# Patient Record
Sex: FEMALE | Race: BLACK OR AFRICAN AMERICAN | NOT HISPANIC OR LATINO | Employment: FULL TIME | URBAN - METROPOLITAN AREA
[De-identification: names, ages, dates, MRNs, and addresses within clinical notes are randomized per-mention and may not be internally consistent; named-entity substitution may affect disease eponyms.]

---

## 2023-04-16 LAB — GROUP B STREP SCREEN: NORMAL

## 2023-12-20 ENCOUNTER — INITIAL PRENATAL (OUTPATIENT)
Dept: OBSTETRICS AND GYNECOLOGY | Facility: CLINIC | Age: 38
End: 2023-12-20
Payer: COMMERCIAL

## 2023-12-20 ENCOUNTER — LAB (OUTPATIENT)
Dept: LAB | Facility: LAB | Age: 38
End: 2023-12-20
Payer: COMMERCIAL

## 2023-12-20 VITALS
BODY MASS INDEX: 33.36 KG/M2 | SYSTOLIC BLOOD PRESSURE: 102 MMHG | WEIGHT: 194.38 LBS | DIASTOLIC BLOOD PRESSURE: 74 MMHG

## 2023-12-20 DIAGNOSIS — Z34.91 PRENATAL CARE IN FIRST TRIMESTER (HHS-HCC): Primary | ICD-10-CM

## 2023-12-20 DIAGNOSIS — Z34.91 PRENATAL CARE IN FIRST TRIMESTER (HHS-HCC): ICD-10-CM

## 2023-12-20 PROBLEM — Z34.80 SUPERVISION OF OTHER NORMAL PREGNANCY, ANTEPARTUM (HHS-HCC): Status: ACTIVE | Noted: 2023-12-20

## 2023-12-20 PROBLEM — O09.529 ADVANCED MATERNAL AGE IN MULTIGRAVIDA (HHS-HCC): Status: ACTIVE | Noted: 2023-12-20

## 2023-12-20 LAB
ABO GROUP (TYPE) IN BLOOD: NORMAL
ANTIBODY SCREEN: NORMAL
ERYTHROCYTE [DISTWIDTH] IN BLOOD BY AUTOMATED COUNT: 12.8 % (ref 11.5–14.5)
EST. AVERAGE GLUCOSE BLD GHB EST-MCNC: 105 MG/DL
HBA1C MFR BLD: 5.3 %
HBV SURFACE AG SERPL QL IA: NONREACTIVE
HCT VFR BLD AUTO: 41.4 % (ref 36–46)
HCV AB SER QL: NONREACTIVE
HGB BLD-MCNC: 13.1 G/DL (ref 12–16)
HIV 1+2 AB+HIV1 P24 AG SERPL QL IA: NONREACTIVE
MCH RBC QN AUTO: 27.8 PG (ref 26–34)
MCHC RBC AUTO-ENTMCNC: 31.6 G/DL (ref 32–36)
MCV RBC AUTO: 88 FL (ref 80–100)
NRBC BLD-RTO: 0 /100 WBCS (ref 0–0)
PLATELET # BLD AUTO: 312 X10*3/UL (ref 150–450)
PREGNANCY TEST URINE, POC: POSITIVE
RBC # BLD AUTO: 4.72 X10*6/UL (ref 4–5.2)
REFLEX ADDED, ANEMIA PANEL: NORMAL
RH FACTOR (ANTIGEN D): NORMAL
RUBV IGG SERPL IA-ACNC: 1.9 IA
RUBV IGG SERPL QL IA: POSITIVE
T PALLIDUM AB SER QL: NONREACTIVE
WBC # BLD AUTO: 5.3 X10*3/UL (ref 4.4–11.3)

## 2023-12-20 PROCEDURE — 86803 HEPATITIS C AB TEST: CPT

## 2023-12-20 PROCEDURE — 87800 DETECT AGNT MULT DNA DIREC: CPT

## 2023-12-20 PROCEDURE — 87340 HEPATITIS B SURFACE AG IA: CPT

## 2023-12-20 PROCEDURE — 86901 BLOOD TYPING SEROLOGIC RH(D): CPT

## 2023-12-20 PROCEDURE — 86900 BLOOD TYPING SEROLOGIC ABO: CPT

## 2023-12-20 PROCEDURE — 81025 URINE PREGNANCY TEST: CPT

## 2023-12-20 PROCEDURE — 87086 URINE CULTURE/COLONY COUNT: CPT

## 2023-12-20 PROCEDURE — 83036 HEMOGLOBIN GLYCOSYLATED A1C: CPT

## 2023-12-20 PROCEDURE — 36415 COLL VENOUS BLD VENIPUNCTURE: CPT

## 2023-12-20 PROCEDURE — 87389 HIV-1 AG W/HIV-1&-2 AB AG IA: CPT

## 2023-12-20 PROCEDURE — 86850 RBC ANTIBODY SCREEN: CPT

## 2023-12-20 PROCEDURE — 0500F INITIAL PRENATAL CARE VISIT: CPT

## 2023-12-20 PROCEDURE — 86317 IMMUNOASSAY INFECTIOUS AGENT: CPT

## 2023-12-20 PROCEDURE — 86780 TREPONEMA PALLIDUM: CPT

## 2023-12-20 PROCEDURE — 85027 COMPLETE CBC AUTOMATED: CPT

## 2023-12-20 RX ORDER — BIOTIN 10 MG
TABLET ORAL
COMMUNITY

## 2023-12-20 RX ORDER — GLUCOSAM/CHONDRO/HERB 149/HYAL 750-100 MG
TABLET ORAL
COMMUNITY

## 2023-12-20 RX ORDER — ASCORBIC ACID 250 MG
250 TABLET ORAL DAILY
COMMUNITY

## 2023-12-20 ASSESSMENT — ENCOUNTER SYMPTOMS
GASTROINTESTINAL NEGATIVE: 0
ENDOCRINE NEGATIVE: 0
NEUROLOGICAL NEGATIVE: 0
RESPIRATORY NEGATIVE: 0
CARDIOVASCULAR NEGATIVE: 0
EYES NEGATIVE: 0
PSYCHIATRIC NEGATIVE: 0
HEMATOLOGIC/LYMPHATIC NEGATIVE: 0
ALLERGIC/IMMUNOLOGIC NEGATIVE: 0
MUSCULOSKELETAL NEGATIVE: 0
CONSTITUTIONAL NEGATIVE: 0

## 2023-12-20 NOTE — PROGRESS NOTES
Paige Vieyra is a 38 y.o.  at Unknown with a working estimated date of delivery of Not found. who presents for an initial prenatal visit. This pregnancy is unplanned.    No LMP recorded. Patient is pregnant.  Still breastfeeding, though supply has decreased per patient since having her positive pregnancy test.  LMP sometime in October; this was her only menses since having her son in May of this year.  Dating US ordered and patient advised to schedule.     Patient currently experiencing:  Nausea    Bleeding or cramping since LMP: no  Taking prenatal vitamin: Yes  Ultrasound completed this pregnancy: No      OB History    Para Term  AB Living   5 3 3 0 1 3   SAB IAB Ectopic Multiple Live Births   1 0 0 0 3      # Outcome Date GA Lbr Manuel/2nd Weight Sex Delivery Anes PTL Lv   5 Current            4 SAB            3 Term      Vag-Spont      2 Term      Vag-Spont      1 Term      Vag-Spont           Prior pregnancy complications: AMA, had IAI with son born in May.    History of hypertension:  No    Preeclampsia Risk - ASA prophylaxis; pt does meet criteria (age, BMI, race). Will start 162 mg. ASA daily at 12-16 weeks.    History reviewed. No pertinent past medical history.     Last pap: 2022, normal HPV neg.  Hx Depression/Anxiety - No    Past Surgical History:   Procedure Laterality Date    OTHER SURGICAL HISTORY  2020    Colposcopy    OTHER SURGICAL HISTORY  2020    Cystoscopy    OTHER SURGICAL HISTORY  2020    Laminectomy      Discussed patient history of laminectomy with Dr. Mary during her prior pregnancy -- No need for anesthesia consult per Dr. Mary.     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 Oriented to practice, including available collaboration and consulting with physicians.   Discussed routine OB labs, including serum STI/HIV, CBC, blood type and screen.    Pregravid BMI: 33.46  Expected Total Weight Gain: 5 kg (11  lb)-9 kg (19 lb)     Obesity in Pregnancy -- BMI >30; HgA1C to be ordered with new OB labs. Limiting weight gain to 11-20 lbs through healthy eating habits and exercise reviewed.     Education provided r/t nutrition, folic acid supplementation, dietary guidelines, exercise, smoking, alcohol, caffeine, and drug use.    Covid vaccinated x 2; declines further booster.  Strong recommendation and support for Covid vaccine discussed. Patient aware of increased rate of hospitalization, intubation, and death with Covid in pregnancy - Demonstrated safety of vaccination during pregnancy with no associated increases in GDM, hypertensive disorders, miscarriage/ labor nor fetal anomalies reviewed. Patient aware vaccination is recommended by ACNM, ACOG, SMFM, and CDC - She declines booster vaccination.    Flu vaccinated.       Current Outpatient Medications:     ascorbic acid (Vitamin C) 250 mg tablet, Take 1 tablet (250 mg) by mouth once daily., Disp: , Rfl:     omega 3-dha-epa-fish oil (Fish OiL) 1,000 mg (120 mg-180 mg) capsule, Take by mouth., Disp: , Rfl:     prenatal19-iron-folic-omega3 29-1-400 mg combo pack,tablet and cap,, Take by mouth., Disp: , Rfl:      Genetic Testing - The patient was counselled regarding prenatal genetic testing options and was provided literature in the obstetric folder. First line screening options, including cfDNA and first trimester ultrasound for nuchal translucency, were discussed and offered.  Benefits, drawbacks, and limitations were explained respectively.  It was clearly stated that only diagnostic testing (amniocentesis) provides definitive information regarding a genetic diagnosis in the fetus. It was discussed with the patient that the false positive rates for NIPT is higher for women under 35 years of age. The patient was informed that the cost of NIPT ranges and may not be covered by insurance depending on patient's age and risk factors. Patient aware that gender testing is  available at a fraction of the cost through [employer].  This patient has decided to decline.  Also does not believe that she wants to have 13 week NT US.     Warning s/s discussed and SAB precautions reviewed.  F/U in 4 weeks -- Review dating U/S and new OB labs.    Vijaya Richter, AMNA-MARIOM

## 2023-12-21 LAB
BACTERIA UR CULT: NORMAL
C TRACH RRNA SPEC QL NAA+PROBE: NEGATIVE
N GONORRHOEA DNA SPEC QL PROBE+SIG AMP: NEGATIVE

## 2023-12-22 ENCOUNTER — ANCILLARY PROCEDURE (OUTPATIENT)
Dept: RADIOLOGY | Facility: CLINIC | Age: 38
End: 2023-12-22
Payer: COMMERCIAL

## 2023-12-22 DIAGNOSIS — Z34.91 PRENATAL CARE IN FIRST TRIMESTER (HHS-HCC): ICD-10-CM

## 2023-12-22 PROCEDURE — 76817 TRANSVAGINAL US OBSTETRIC: CPT | Performed by: OBSTETRICS & GYNECOLOGY

## 2023-12-22 PROCEDURE — 76817 TRANSVAGINAL US OBSTETRIC: CPT

## 2023-12-22 PROCEDURE — 76801 OB US < 14 WKS SINGLE FETUS: CPT

## 2023-12-22 PROCEDURE — 76801 OB US < 14 WKS SINGLE FETUS: CPT | Performed by: OBSTETRICS & GYNECOLOGY

## 2024-01-18 ENCOUNTER — ROUTINE PRENATAL (OUTPATIENT)
Dept: OBSTETRICS AND GYNECOLOGY | Facility: CLINIC | Age: 39
End: 2024-01-18
Payer: COMMERCIAL

## 2024-01-18 ENCOUNTER — TELEPHONE (OUTPATIENT)
Dept: OBSTETRICS AND GYNECOLOGY | Facility: CLINIC | Age: 39
End: 2024-01-18

## 2024-01-18 VITALS — SYSTOLIC BLOOD PRESSURE: 102 MMHG | WEIGHT: 197.5 LBS | DIASTOLIC BLOOD PRESSURE: 66 MMHG | BODY MASS INDEX: 33.9 KG/M2

## 2024-01-18 DIAGNOSIS — O09.521 MULTIGRAVIDA OF ADVANCED MATERNAL AGE IN FIRST TRIMESTER (HHS-HCC): ICD-10-CM

## 2024-01-18 DIAGNOSIS — Z34.80 SUPERVISION OF OTHER NORMAL PREGNANCY, ANTEPARTUM (HHS-HCC): Primary | ICD-10-CM

## 2024-01-18 PROCEDURE — 0501F PRENATAL FLOW SHEET: CPT

## 2024-01-18 NOTE — PROGRESS NOTES
Subjective   Paige Vieyra is a 38 y.o.  at 9w6d with a working estimated date of delivery of 2024, by Ultrasound who presents for a routine prenatal visit.    Patient reports overall feeling well; no concerns or OB complaints.  Patient had dating US done; states that she was advised to return in 2 weeks, though she does not plan to do so, as the ultrasounds are expensive.  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     Objective   Visit Vitals  /66   Wt 89.6 kg (197 lb 8 oz)   LMP  (LMP Unknown)   BMI 33.90 kg/m²   OB Status Pregnant   Smoking Status Never   BSA 2.01 m²     Vitals:    24 1113   Weight: 89.6 kg (197 lb 8 oz)      Pregravid Weight/BMI - 88.5 kg (195 lb) / 33.46  Expected Total Weight Gain - 5 kg (11 lb)-9 kg (19 lb)  TW.134 kg (2 lb 8 oz)    Assessment/Plan   Reviewed normal new OB labs.  Reviewed dating US -- Advised that she may reschedule for follow-up ultrasound as recommended by [facility], though patient declines.  Offered follow-up prenatal visit in 1 week for fetal heart tones (unable to obtain by Doppler today given early GA), though patient declines.  States that she will wait until her next PNV.  Reminded to begin ASA at 12 weeks.   Warning signs and symptoms reviewed; patient has emergency answering service phone line number and is aware of when to call.   Remainder of plan per problem list as below.     Medical Problems       Problem List       Supervision of other normal pregnancy, antepartum    Overview Addendum 2023 10:44 AM by ANNA Ayala     Starting BMI 32.  Covid vaccinated x 2, declines booster.  Flu vaccinated.  Hx of HSV-1, only oral outbreaks per patient.  Will begin ASA at 12 weeks.         Advanced maternal age in multigravida    Overview Signed 2023 10:44 AM by ANNA Ayala     Declines aneuploidy screening.             F/U in 4 weeks.    ANNA Ayala

## 2024-01-18 NOTE — TELEPHONE ENCOUNTER
Pt is requesting a dentist note to provide to her dentist for a future appointment. Name of office is [facility].

## 2024-02-15 ENCOUNTER — ROUTINE PRENATAL (OUTPATIENT)
Dept: OBSTETRICS AND GYNECOLOGY | Facility: CLINIC | Age: 39
End: 2024-02-15
Payer: COMMERCIAL

## 2024-02-15 VITALS — SYSTOLIC BLOOD PRESSURE: 116 MMHG | DIASTOLIC BLOOD PRESSURE: 78 MMHG | WEIGHT: 197.6 LBS | BODY MASS INDEX: 33.92 KG/M2

## 2024-02-15 DIAGNOSIS — O21.9 NAUSEA AND VOMITING IN PREGNANCY PRIOR TO 22 WEEKS GESTATION (HHS-HCC): ICD-10-CM

## 2024-02-15 DIAGNOSIS — Z34.80 SUPERVISION OF OTHER NORMAL PREGNANCY, ANTEPARTUM (HHS-HCC): Primary | ICD-10-CM

## 2024-02-15 PROCEDURE — 0501F PRENATAL FLOW SHEET: CPT

## 2024-02-15 RX ORDER — ASPIRIN 81 MG/1
162 TABLET ORAL DAILY
Qty: 60 TABLET | Refills: 11 | Status: SHIPPED | OUTPATIENT
Start: 2024-02-15 | End: 2025-02-14

## 2024-02-15 RX ORDER — ONDANSETRON 4 MG/1
4 TABLET, FILM COATED ORAL EVERY 8 HOURS PRN
Qty: 30 TABLET | Refills: 0 | Status: SHIPPED | OUTPATIENT
Start: 2024-02-15

## 2024-02-15 NOTE — PROGRESS NOTES
Subjective   Paige Vieyra is a 38 y.o.  at 13w6d with a working estimated date of delivery of 2024, by Ultrasound who presents for a routine prenatal visit.    Patient reports overall feeling well; no concerns or OB complaints.  Occasional nausea; requesting Zofran Rx to have as needed.  Requesting Rx for ASA.    Objective   Visit Vitals  /78   Wt 89.6 kg (197 lb 9.6 oz)   LMP  (LMP Unknown)   BMI 33.92 kg/m²   OB Status Pregnant   Smoking Status Never   BSA 2.01 m²     Vitals:    02/15/24 1016   Weight: 89.6 kg (197 lb 9.6 oz)      Pregravid Weight/BMI - 88.5 kg (195 lb) / 33.46  Expected Total Weight Gain - 5 kg (11 lb)-9 kg (19 lb)  TW.179 kg (2 lb 9.6 oz)  Fundal height and FHR per prenatal flowsheet.    Assessment/Plan   Advised to schedule for anatomy US at 19 weeks.   Rx for Zofran and ASA sent to patient pharmacy.   Dental letter provided.   Warning signs and symptoms reviewed; patient has emergency answering service phone line number and is aware of when to call.   Remainder of plan per problem list as below.     Medical Problems       Problem List       Supervision of other normal pregnancy, antepartum    Overview Addendum 2023 10:44 AM by ANNA Ayala     Starting BMI 32.  Covid vaccinated x 2, declines booster.  Flu vaccinated.  Hx of HSV-1, only oral outbreaks per patient.  Will begin ASA at 12 weeks.         Advanced maternal age in multigravida    Overview Signed 2023 10:44 AM by ANNA Ayala     Declines aneuploidy screening.             F/U in 4 weeks.    ANNA Ayala

## 2024-03-14 ENCOUNTER — ROUTINE PRENATAL (OUTPATIENT)
Dept: OBSTETRICS AND GYNECOLOGY | Facility: CLINIC | Age: 39
End: 2024-03-14
Payer: COMMERCIAL

## 2024-03-14 VITALS — BODY MASS INDEX: 35.79 KG/M2 | DIASTOLIC BLOOD PRESSURE: 56 MMHG | WEIGHT: 208.5 LBS | SYSTOLIC BLOOD PRESSURE: 114 MMHG

## 2024-03-14 DIAGNOSIS — Z34.80 SUPERVISION OF OTHER NORMAL PREGNANCY, ANTEPARTUM (HHS-HCC): Primary | ICD-10-CM

## 2024-03-14 DIAGNOSIS — O26.899 PELVIC PRESSURE IN PREGNANCY (HHS-HCC): ICD-10-CM

## 2024-03-14 DIAGNOSIS — O09.522 MULTIGRAVIDA OF ADVANCED MATERNAL AGE IN SECOND TRIMESTER (HHS-HCC): ICD-10-CM

## 2024-03-14 DIAGNOSIS — R10.2 PELVIC PRESSURE IN PREGNANCY (HHS-HCC): ICD-10-CM

## 2024-03-14 PROCEDURE — 0501F PRENATAL FLOW SHEET: CPT

## 2024-03-14 NOTE — PROGRESS NOTES
Subjective   Paige Vieyra is a 38 y.o.  at 17w6d with a working estimated date of delivery of 2024, by Ultrasound who presents for a routine prenatal visit.    Patient reports overall feeling well; no concerns or OB complaints.  Felt flutters for the first time last night!  Feeling occasional pelvic pressure.    Objective   Visit Vitals  /56   Wt 94.6 kg (208 lb 8 oz)   LMP  (LMP Unknown)   BMI 35.79 kg/m²   OB Status Pregnant   Smoking Status Never   BSA 2.07 m²     Vitals:    24 1126   Weight: 94.6 kg (208 lb 8 oz)      Pregravid Weight/BMI - 88.5 kg (195 lb) / 33.46  Expected Total Weight Gain - 5 kg (11 lb)-9 kg (19 lb)  TW.124 kg (13 lb 8 oz)  Fundal height and FHR per prenatal flowsheet.    Assessment/Plan   Anatomy US is scheduled.   Comfort measures for pelvic pressure reviewed, plans to purchase belly band.   Warning signs and symptoms reviewed; patient has emergency answering service phone line number and is aware of when to call.   Remainder of plan per problem list as below.     Medical Problems       Problem List       Supervision of other normal pregnancy, antepartum    Overview Addendum 2023 10:44 AM by ANNA Ayala     Starting BMI 32.  Covid vaccinated x 2, declines booster.  Flu vaccinated.  Hx of HSV-1, only oral outbreaks per patient.  Will begin ASA at 12 weeks.         Advanced maternal age in multigravida    Overview Signed 2023 10:44 AM by ANNA Ayala     Declines aneuploidy screening.             F/U in 4 weeks; order GTT/CBC.    ANNA Ayala

## 2024-04-04 ENCOUNTER — HOSPITAL ENCOUNTER (OUTPATIENT)
Dept: RADIOLOGY | Facility: CLINIC | Age: 39
Discharge: HOME | End: 2024-04-04
Payer: COMMERCIAL

## 2024-04-04 DIAGNOSIS — Z34.91 PRENATAL CARE IN FIRST TRIMESTER (HHS-HCC): ICD-10-CM

## 2024-04-04 PROCEDURE — 76811 OB US DETAILED SNGL FETUS: CPT

## 2024-04-04 PROCEDURE — 76811 OB US DETAILED SNGL FETUS: CPT | Performed by: OBSTETRICS & GYNECOLOGY

## 2024-04-11 ENCOUNTER — ROUTINE PRENATAL (OUTPATIENT)
Dept: OBSTETRICS AND GYNECOLOGY | Facility: CLINIC | Age: 39
End: 2024-04-11
Payer: COMMERCIAL

## 2024-04-11 ENCOUNTER — APPOINTMENT (OUTPATIENT)
Dept: OBSTETRICS AND GYNECOLOGY | Facility: CLINIC | Age: 39
End: 2024-04-11
Payer: COMMERCIAL

## 2024-04-11 VITALS — DIASTOLIC BLOOD PRESSURE: 70 MMHG | BODY MASS INDEX: 36.48 KG/M2 | SYSTOLIC BLOOD PRESSURE: 110 MMHG | WEIGHT: 212.5 LBS

## 2024-04-11 DIAGNOSIS — Z34.80 SUPERVISION OF OTHER NORMAL PREGNANCY, ANTEPARTUM (HHS-HCC): Primary | ICD-10-CM

## 2024-04-11 DIAGNOSIS — O09.522 MULTIGRAVIDA OF ADVANCED MATERNAL AGE IN SECOND TRIMESTER (HHS-HCC): ICD-10-CM

## 2024-04-11 DIAGNOSIS — Z13.1 SCREENING FOR DIABETES MELLITUS (DM): ICD-10-CM

## 2024-04-11 PROCEDURE — 0501F PRENATAL FLOW SHEET: CPT

## 2024-04-11 ASSESSMENT — EDINBURGH POSTNATAL DEPRESSION SCALE (EPDS)
I HAVE BEEN ABLE TO LAUGH AND SEE THE FUNNY SIDE OF THINGS: AS MUCH AS I ALWAYS COULD
TOTAL SCORE: 0
THE THOUGHT OF HARMING MYSELF HAS OCCURRED TO ME: NEVER
I HAVE BEEN ANXIOUS OR WORRIED FOR NO GOOD REASON: NO, NOT AT ALL
I HAVE BLAMED MYSELF UNNECESSARILY WHEN THINGS WENT WRONG: NO, NEVER
I HAVE BEEN SO UNHAPPY THAT I HAVE HAD DIFFICULTY SLEEPING: NOT AT ALL
I HAVE LOOKED FORWARD WITH ENJOYMENT TO THINGS: AS MUCH AS I EVER DID
THINGS HAVE BEEN GETTING ON TOP OF ME: NO, I HAVE BEEN COPING AS WELL AS EVER
I HAVE FELT SCARED OR PANICKY FOR NO GOOD REASON: NO, NOT AT ALL
I HAVE FELT SAD OR MISERABLE: NO, NOT AT ALL
I HAVE BEEN SO UNHAPPY THAT I HAVE BEEN CRYING: NO, NEVER

## 2024-04-11 NOTE — PROGRESS NOTES
Subjective   Paige Vieyra is a 38 y.o.  at 21w6d with a working estimated date of delivery of 2024, by Ultrasound who presents for a routine prenatal visit.    Patient reports overall feeling well; no concerns or OB complaints.  Good fetal movement.  It's a GIRL!     Objective   Visit Vitals  /70   Wt 96.4 kg (212 lb 8 oz)   LMP  (LMP Unknown)   BMI 36.48 kg/m²   OB Status Pregnant   Smoking Status Never   BSA 2.09 m²     Vitals:    24 1134   Weight: 96.4 kg (212 lb 8 oz)      Pregravid Weight/BMI - 88.5 kg (195 lb) / 33.46  Expected Total Weight Gain - 5 kg (11 lb)-9 kg (19 lb)  TW.938 kg (17 lb 8 oz)  Fundal height and FHR per prenatal flowsheet.    Assessment/Plan   Reviewed normal anatomy US.  28 week folder provided.  Warning signs and symptoms reviewed; patient has emergency answering service phone line number and is aware of when to call.   Remainder of plan per problem list as below.     Medical Problems       Problem List       Supervision of other normal pregnancy, antepartum    Overview Addendum 2023 10:44 AM by ANNA Ayala     Starting BMI 32.  Covid vaccinated x 2, declines booster.  Flu vaccinated.  Hx of HSV-1, only oral outbreaks per patient.  Will begin ASA at 12 weeks.         Advanced maternal age in multigravida    Overview Signed 2023 10:44 AM by ANNA Ayala     Declines aneuploidy screening.             F/U in 4 weeks; GTT/CBC, discuss Tdap.    ANNA Ayala

## 2024-05-09 ENCOUNTER — APPOINTMENT (OUTPATIENT)
Dept: OBSTETRICS AND GYNECOLOGY | Facility: CLINIC | Age: 39
End: 2024-05-09
Payer: COMMERCIAL

## 2024-05-14 ENCOUNTER — APPOINTMENT (OUTPATIENT)
Dept: OPHTHALMOLOGY | Facility: CLINIC | Age: 39
End: 2024-05-14
Payer: COMMERCIAL

## 2024-05-30 ENCOUNTER — LAB (OUTPATIENT)
Dept: LAB | Facility: LAB | Age: 39
End: 2024-05-30
Payer: COMMERCIAL

## 2024-05-30 DIAGNOSIS — Z13.1 SCREENING FOR DIABETES MELLITUS (DM): ICD-10-CM

## 2024-05-30 LAB
ERYTHROCYTE [DISTWIDTH] IN BLOOD BY AUTOMATED COUNT: 13.1 % (ref 11.5–14.5)
GLUCOSE 1H P 50 G GLC PO SERPL-MCNC: 86 MG/DL
HCT VFR BLD AUTO: 37.5 % (ref 36–46)
HGB BLD-MCNC: 12.3 G/DL (ref 12–16)
MCH RBC QN AUTO: 28.2 PG (ref 26–34)
MCHC RBC AUTO-ENTMCNC: 32.8 G/DL (ref 32–36)
MCV RBC AUTO: 86 FL (ref 80–100)
NRBC BLD-RTO: 0 /100 WBCS (ref 0–0)
PLATELET # BLD AUTO: 309 X10*3/UL (ref 150–450)
RBC # BLD AUTO: 4.36 X10*6/UL (ref 4–5.2)
REFLEX ADDED, ANEMIA PANEL: NORMAL
WBC # BLD AUTO: 7.8 X10*3/UL (ref 4.4–11.3)

## 2024-05-30 PROCEDURE — 85027 COMPLETE CBC AUTOMATED: CPT

## 2024-05-30 PROCEDURE — 36415 COLL VENOUS BLD VENIPUNCTURE: CPT

## 2024-05-30 PROCEDURE — 82947 ASSAY GLUCOSE BLOOD QUANT: CPT

## 2024-06-12 ENCOUNTER — APPOINTMENT (OUTPATIENT)
Dept: OBSTETRICS AND GYNECOLOGY | Facility: CLINIC | Age: 39
End: 2024-06-12
Payer: COMMERCIAL

## 2024-06-12 VITALS
SYSTOLIC BLOOD PRESSURE: 100 MMHG | BODY MASS INDEX: 37.61 KG/M2 | DIASTOLIC BLOOD PRESSURE: 70 MMHG | WEIGHT: 219.13 LBS

## 2024-06-12 DIAGNOSIS — Z3A.30 30 WEEKS GESTATION OF PREGNANCY (HHS-HCC): Primary | ICD-10-CM

## 2024-06-12 DIAGNOSIS — L30.9 ECZEMA, UNSPECIFIED TYPE: ICD-10-CM

## 2024-06-12 DIAGNOSIS — O99.891 BACK PAIN AFFECTING PREGNANCY IN THIRD TRIMESTER (HHS-HCC): ICD-10-CM

## 2024-06-12 DIAGNOSIS — M54.9 BACK PAIN AFFECTING PREGNANCY IN THIRD TRIMESTER (HHS-HCC): ICD-10-CM

## 2024-06-12 PROBLEM — Z98.890 HISTORY OF LAMINECTOMY: Status: ACTIVE | Noted: 2024-06-12

## 2024-06-12 PROCEDURE — 0501F PRENATAL FLOW SHEET: CPT

## 2024-06-12 RX ORDER — TRIAMCINOLONE ACETONIDE 1 MG/G
OINTMENT TOPICAL 2 TIMES DAILY
Qty: 15 G | Refills: 0 | Status: SHIPPED | OUTPATIENT
Start: 2024-06-12

## 2024-06-12 RX ORDER — CYCLOBENZAPRINE HCL 5 MG
5 TABLET ORAL DAILY PRN
Qty: 10 TABLET | Refills: 0 | Status: SHIPPED | OUTPATIENT
Start: 2024-06-12 | End: 2024-06-22

## 2024-06-12 NOTE — PROGRESS NOTES
Subjective   Paige Vieyra is a 38 y.o.  at 30w5d with a working estimated date of delivery of 2024, by Ultrasound who presents for a routine prenatal visit.    Patient tearful upon entrance to room today --    Last seen at 21 weeks; states that she was ill at the time that her last appointment was scheduled for.    States that she is in a lot of pain; lower back, hips, pelvis.  Has tried ibuprofen and Tylenol without relief.  Uses a massage chair that her daughter bought for her with some relief.      Concern for eczematous skin patches -- One on face, two on lower back, one on right breast.  Has tried Aquaphor and steroid cream with little relief.    Wants to potentially plan for 39 week IOL.    Objective   Visit Vitals  /70   Wt 99.4 kg (219 lb 2 oz)   LMP  (LMP Unknown)   BMI 37.61 kg/m²   OB Status Pregnant   Smoking Status Never   BSA 2.12 m²     Vitals:    24 1109   Weight: 99.4 kg (219 lb 2 oz)      Pregravid Weight/BMI - 88.5 kg (195 lb) / 33.46  Expected Total Weight Gain - 5 kg (11 lb)-9 kg (19 lb)  TWG: 10.9 kg (24 lb 2 oz)  Fundal height and FHR per prenatal flowsheet.    Assessment/Plan   Reviewed anatomy US with patient.  Recommended for 30 week follow up by MFM, though patient did not schedule, and states that she does not plan to do so, though is aware of the recommendation.  Reviewed normal GTT and Hgb 12.3.  Pain and discomfort during pregnancy -- Will send short-term Rx for Flexeril to patient pharmacy.  Reviewed safety profile during pregnancy.  Discussed NOT using ibuprofen and risks of ibuprofen use during pregnancy; Tylenol ONLY.  Will also place referral to chiropractic care in patient chart and outside referral also provided.  Eczematous skin patches -- Will send Rx for short term use of triamcinolone cream.  Reviewed safety profile during pregnancy.  Referral to dermatology also provided.   Warning signs and symptoms reviewed; patient has emergency answering  service phone line number and is aware of when to call.   Remainder of plan per problem list as below.     Medical Problems       Problem List       Supervision of other normal pregnancy, antepartum ([facility])    Overview Addendum 4/11/2024 12:56 PM by ANNA Ayala     Starting BMI 32.  Covid vaccinated x 2, declines booster.  Flu vaccinated.  Hx of HSV-1, only oral outbreaks per patient.  Will begin ASA at 12 weeks.  It's a GIRL!          Advanced maternal age in multigravida ([facility])    Overview Signed 12/20/2023 10:44 AM by ANNA Ayala     Declines aneuploidy screening.             F/U in 2 weeks.    ANNA Ayala

## 2024-06-27 ENCOUNTER — APPOINTMENT (OUTPATIENT)
Dept: OBSTETRICS AND GYNECOLOGY | Facility: CLINIC | Age: 39
End: 2024-06-27
Payer: COMMERCIAL

## 2024-07-03 ENCOUNTER — APPOINTMENT (OUTPATIENT)
Dept: OBSTETRICS AND GYNECOLOGY | Facility: CLINIC | Age: 39
End: 2024-07-03
Payer: COMMERCIAL

## 2024-07-03 VITALS
SYSTOLIC BLOOD PRESSURE: 108 MMHG | WEIGHT: 216.25 LBS | BODY MASS INDEX: 37.12 KG/M2 | DIASTOLIC BLOOD PRESSURE: 75 MMHG

## 2024-07-03 DIAGNOSIS — Z71.85 VACCINE COUNSELING: ICD-10-CM

## 2024-07-03 DIAGNOSIS — Z3A.33 33 WEEKS GESTATION OF PREGNANCY (HHS-HCC): Primary | ICD-10-CM

## 2024-07-03 DIAGNOSIS — Z28.21 VACCINATION NOT CARRIED OUT BECAUSE OF PATIENT REFUSAL: ICD-10-CM

## 2024-07-03 PROCEDURE — 0501F PRENATAL FLOW SHEET: CPT

## 2024-07-03 ASSESSMENT — EDINBURGH POSTNATAL DEPRESSION SCALE (EPDS)
I HAVE FELT SAD OR MISERABLE: NO, NOT AT ALL
I HAVE BEEN SO UNHAPPY THAT I HAVE HAD DIFFICULTY SLEEPING: NOT AT ALL
I HAVE BEEN ANXIOUS OR WORRIED FOR NO GOOD REASON: NO, NOT AT ALL
I HAVE LOOKED FORWARD WITH ENJOYMENT TO THINGS: AS MUCH AS I EVER DID
I HAVE BEEN ABLE TO LAUGH AND SEE THE FUNNY SIDE OF THINGS: AS MUCH AS I ALWAYS COULD
I HAVE BLAMED MYSELF UNNECESSARILY WHEN THINGS WENT WRONG: NO, NEVER
THINGS HAVE BEEN GETTING ON TOP OF ME: NO, MOST OF THE TIME I HAVE COPED QUITE WELL
TOTAL SCORE: 1
THE THOUGHT OF HARMING MYSELF HAS OCCURRED TO ME: NEVER
I HAVE FELT SCARED OR PANICKY FOR NO GOOD REASON: NO, NOT AT ALL
I HAVE BEEN SO UNHAPPY THAT I HAVE BEEN CRYING: NO, NEVER

## 2024-07-03 NOTE — PROGRESS NOTES
Subjective   Paige Vieyra is a 38 y.o.  at 33w5d with a working estimated date of delivery of 2024, by Ultrasound who presents for a routine prenatal visit.    Patient reports overall feeling well; no concerns or OB complaints.  Wearing belly band; has chiropractic appointment scheduled.  Dry skin patches improving.  Changed her mind on scheduling 39 week IOL, no longer wants to schedule at this time.    Objective   Visit Vitals  /75   Wt 98.1 kg (216 lb 4 oz)   LMP  (LMP Unknown)   BMI 37.12 kg/m²   OB Status Pregnant   Smoking Status Never   BSA 2.1 m²     Vitals:    24 0900   Weight: 98.1 kg (216 lb 4 oz)      Pregravid Weight/BMI - 88.5 kg (195 lb) / 33.46  Expected Total Weight Gain - 5 kg (11 lb)-9 kg (19 lb)  TW.639 kg (21 lb 4 oz)  Fundal height and FHR per prenatal flowsheet.    Assessment/Plan     Paige Vieyra was counseled on the recommendation for and benefits of TDaP vaccination during pregnancy.  We discussed the passive immunity that occurs after maternal vaccination during pregnancy, and the antibodies that cross the placenta to the fetus to protect baby in the first few months of life.  Babies do not receive their first vaccination for pertussis/whooping cough until 2 months of life, during which the baby is vulnerable to this bacterial infection.  Whooping cough can cause severe and even life-threatening infections, and maternal vaccination between 27 and 36 weeks of pregnancy is the best method to protect baby from Whooping cough until they are eligible for the vaccination. After discussion the patient declined the vaccine. >5 minutes were spent on counseling related to vaccine recommendations and safety.      labor and warning signs and symptoms reviewed; patient has emergency answering service phone line number and is aware of when to call.   Remainder of plan per problem list as below.     Medical Problems       Problem List       Supervision of other  "normal pregnancy, antepartum ([facility])    Overview Addendum 4/11/2024 12:56 PM by ANNA Ayala     Starting BMI 32.  Covid vaccinated x 2, declines booster.  Flu vaccinated.  Hx of HSV-1, only oral outbreaks per patient.  Will begin ASA at 12 weeks.  It's a GIRL!          Advanced maternal age in multigravida ([facility])    Overview Signed 12/20/2023 10:44 AM by ANNA Ayala     Declines aneuploidy screening.         History of laminectomy    Overview Signed 6/12/2024 12:26 PM by ANNA Ayala     Note from PREVIOUS PREGNANCY -- Response from Dr. Mary -- \"She wouldnt benefit from a anesthesia consult. We cant changeher anatomy. Due to scarring on the surgical site epidural spread could be affected. Given that epidural placement is a blind(non imaging guided) procedure, better results than last timecannot be guaranteed. We might get luckier than last time or her scarring might have becomebetter or worse but those are unchangeable factors. We would always try to place this epidural at the lowest level(in this case L3-4) which usually provides good pain reliefespecially during the first stage of labor.\" Vijaya Richter 12/15/2022 11:08 AM             F/U in 3 weeks, GBS, general consent.    ANNA Ayala  "

## 2024-07-12 ENCOUNTER — APPOINTMENT (OUTPATIENT)
Dept: INTEGRATIVE MEDICINE | Facility: CLINIC | Age: 39
End: 2024-07-12
Payer: COMMERCIAL

## 2024-07-12 DIAGNOSIS — M54.50 ACUTE LEFT-SIDED LOW BACK PAIN WITHOUT SCIATICA: ICD-10-CM

## 2024-07-12 DIAGNOSIS — M99.03 SOMATIC DYSFUNCTION OF LUMBAR REGION: ICD-10-CM

## 2024-07-12 DIAGNOSIS — O99.891 BACK PAIN AFFECTING PREGNANCY IN THIRD TRIMESTER (HHS-HCC): ICD-10-CM

## 2024-07-12 DIAGNOSIS — M99.05 SOMATIC DYSFUNCTION OF PELVIS REGION: ICD-10-CM

## 2024-07-12 DIAGNOSIS — M99.02 SOMATIC DYSFUNCTION OF THORACIC REGION: Primary | ICD-10-CM

## 2024-07-12 DIAGNOSIS — M54.9 BACK PAIN AFFECTING PREGNANCY IN THIRD TRIMESTER (HHS-HCC): ICD-10-CM

## 2024-07-12 PROCEDURE — 99203 OFFICE O/P NEW LOW 30 MIN: CPT | Performed by: CHIROPRACTOR

## 2024-07-12 PROCEDURE — 98941 CHIROPRACT MANJ 3-4 REGIONS: CPT | Performed by: CHIROPRACTOR

## 2024-07-12 ASSESSMENT — ENCOUNTER SYMPTOMS
SHORTNESS OF BREATH: 0
VOMITING: 0
DYSURIA: 0
AGITATION: 0
DIARRHEA: 0
DIZZINESS: 0
FEVER: 0
NAUSEA: 0
DIFFICULTY URINATING: 0
COLOR CHANGE: 0

## 2024-07-12 NOTE — PROGRESS NOTES
Assessment/Plan   The patient's LBP w/ radiation L gluteal region is most consistent with pregnancy-related LBP including myofascial pain, SIJ pain, and/or facet joint pain. Less likely is recurrent disc herniation in L/S given previous microdiscectomy. She does have some possible radicular features, however, dec reflex and EHL weakness are also possibly residual from remote radiculopathy pre-surgery and she is mostly neurologically intact. Treatment will involve SMT using pregnancy pillow, STM, stretching.     Visits this year: 1    Subjective     HPI - LBP w/ radiation (L) 7/12/2024 - Patient presents in 37th week of pregnancy endorsing 2-3 month gradual onset of LBP affecting L side of low back predominantly, L gluteal region. Also gets cramping in L thigh, calf, and foot muscles mostly at night. No trauma/injury. Previous microdiscectomy/laminectomy in 2014 which alleviated symptoms of pain/foot drop. Did rehab and had some residual leg pain after and made a full recovery. No LBP through 3 pregnancies until this one, currently 4th pregnancy. 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     Review of Systems   Constitutional:  Negative for fever.   Eyes:  Negative for visual disturbance.   Respiratory:  Negative for shortness of breath.    Cardiovascular:  Negative for chest pain.   Gastrointestinal:  Negative for diarrhea, nausea and vomiting.   Genitourinary:  Negative for difficulty urinating and dysuria.   Skin:  Negative for color change.   Neurological:  Negative for dizziness.   Psychiatric/Behavioral:  Negative for agitation.    All other systems reviewed and are negative.    Objective   Examination findings (e.g., palpation & ROM): Dec LS ROM with pain,   HTN/tender LS erectors, L g medius, L SIJ     Segmental joint dysfunction was identified in the following areas using motion palpation and/or pain provocation assessment:  Cervical:   Thoracic: 5-8  Lumbopelvic: 1, BL  SIJ      Physical Exam  Neurological:      Mental Status: She is alert.      Sensory: Sensation is intact.      Motor: Motor function is intact.      Coordination: Coordination is intact.      Gait: Gait is intact.      Deep Tendon Reflexes:      Reflex Scores:       Patellar reflexes are 2+ on the right side and 1+ on the left side.       Achilles reflexes are 2+ on the right side and 2+ on the left side.     Comments: L EHL 4/5 7/12/2024       Plan   Today's treatment:  SMT to regions of segmental dysfunction identified on exam, using age-appropriate force, and manual diversified technique.   STM to patient tolerance to hypertonic paraspinal muscles 5m  Manual dynamic stretching of the gluteal muscles, hamstrings  Patient noted improved mobility and reduced pain post-treatment    Treatment Plan:   The patient and I discussed the risks and benefits of chiropractic care. Based on the patient's subjective complaints along with the examination findings, it is advised that a course of chiropractic treatment be initiated. The patient provided consent for care. The patient tolerated today's treatment with little or no additional discomfort and was instructed to contact the office for questions or concerns. Will see patient once per week then every 2 weeks when symptoms become mild/manageable, further spaced apart contingent upon improvement.     This chart note was generated using dictation software, and as such, there may be typographical errors present. Abbreviations: Cervical spine (CS), cervical-thoracic (CT), Dry needling (DN), Flexion adduction internal rotation (FAIR), high velocity, low amplitude (HVLA), Lumbar spine (LS), Soft tissue manipulation (STM), spinal manipulative therapy (SMT), Straight leg raise (SLR), Thoracic spine (TS).

## 2024-07-17 ASSESSMENT — ENCOUNTER SYMPTOMS
DIFFICULTY URINATING: 0
DIARRHEA: 0
AGITATION: 0
VOMITING: 0
NAUSEA: 0
DIZZINESS: 0
DYSURIA: 0
FEVER: 0
COLOR CHANGE: 0
SHORTNESS OF BREATH: 0

## 2024-07-17 NOTE — PROGRESS NOTES
Assessment/Plan   The patient's LBP w/ radiation L gluteal region is most consistent with pregnancy-related LBP including myofascial pain, SIJ pain, and/or facet joint pain. Less likely is recurrent disc herniation in L/S given previous microdiscectomy. She does have some possible radicular features, however, dec reflex and EHL weakness are also possibly residual from remote radiculopathy pre-surgery and she is mostly neurologically intact. Treatment will involve SMT using pregnancy pillow, STM, stretching.     Visits this year: 2    Subjective   Patient notes improvement since initial visit noting less pain for 2-3 days. Gradual return of L sided LBP/SIJ pain since, with frequent 6/10 NRS pain/tightness, mostly localized to that area, some radiation to L gluteal region.    HPI - LBP w/ radiation (L) 7/12/2024 - Patient presents in 37th week of pregnancy endorsing 2-3 month gradual onset of LBP affecting L side of low back predominantly, L gluteal region. Also gets cramping in L thigh, calf, and foot muscles mostly at night. No trauma/injury. Previous microdiscectomy/laminectomy in 2014 which alleviated symptoms of pain/foot drop. Did rehab and had some residual leg pain after and made a full recovery. No LBP through 3 pregnancies until this one, currently 4th pregnancy. 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     Review of Systems   Constitutional:  Negative for fever.   Eyes:  Negative for visual disturbance.   Respiratory:  Negative for shortness of breath.    Cardiovascular:  Negative for chest pain.   Gastrointestinal:  Negative for diarrhea, nausea and vomiting.   Genitourinary:  Negative for difficulty urinating and dysuria.   Skin:  Negative for color change.   Neurological:  Negative for dizziness.   Psychiatric/Behavioral:  Negative for agitation.    All other systems reviewed and are negative.    Objective   Examination findings (e.g., palpation & ROM): Dec LS ROM  with pain,   HTN/tender LS erectors, L g medius, L SIJ     Segmental joint dysfunction was identified in the following areas using motion palpation and/or pain provocation assessment:  Cervical:   Thoracic: 5-9  Lumbopelvic: 1, BL SIJ      Physical Exam  Neurological:      Mental Status: She is alert.      Sensory: Sensation is intact.      Motor: Motor function is intact.      Coordination: Coordination is intact.      Gait: Gait is intact.      Deep Tendon Reflexes:      Reflex Scores:       Patellar reflexes are 2+ on the right side and 1+ on the left side.       Achilles reflexes are 2+ on the right side and 2+ on the left side.     Comments: L EHL 4/5 7/12/2024       Plan   Today's treatment:  SMT to regions of segmental dysfunction identified on exam, using age-appropriate force, and manual diversified technique.   STM to patient tolerance to hypertonic paraspinal muscles & L g med 6m  Patient noted improved mobility and reduced pain post-treatment    Treatment Plan:   The patient and I discussed the risks and benefits of chiropractic care. Based on the patient's subjective complaints along with the examination findings, it is advised that a course of chiropractic treatment be initiated. The patient provided consent for care. The patient tolerated today's treatment with little or no additional discomfort and was instructed to contact the office for questions or concerns. Will see patient once per week then every 2 weeks when symptoms become mild/manageable, further spaced apart contingent upon improvement.     This chart note was generated using dictation software, and as such, there may be typographical errors present. Abbreviations: Cervical spine (CS), cervical-thoracic (CT), Dry needling (DN), Flexion adduction internal rotation (FAIR), high velocity, low amplitude (HVLA), Lumbar spine (LS), Soft tissue manipulation (STM), spinal manipulative therapy (SMT), Straight leg raise (SLR), Thoracic spine (TS).

## 2024-07-19 ENCOUNTER — APPOINTMENT (OUTPATIENT)
Dept: INTEGRATIVE MEDICINE | Facility: CLINIC | Age: 39
End: 2024-07-19
Payer: COMMERCIAL

## 2024-07-19 DIAGNOSIS — M99.03 SOMATIC DYSFUNCTION OF LUMBAR REGION: ICD-10-CM

## 2024-07-19 DIAGNOSIS — M54.9 BACK PAIN AFFECTING PREGNANCY IN THIRD TRIMESTER (HHS-HCC): ICD-10-CM

## 2024-07-19 DIAGNOSIS — M99.05 SOMATIC DYSFUNCTION OF PELVIS REGION: Primary | ICD-10-CM

## 2024-07-19 DIAGNOSIS — O99.891 BACK PAIN AFFECTING PREGNANCY IN THIRD TRIMESTER (HHS-HCC): ICD-10-CM

## 2024-07-19 DIAGNOSIS — M99.02 SOMATIC DYSFUNCTION OF THORACIC REGION: ICD-10-CM

## 2024-07-19 DIAGNOSIS — M54.50 ACUTE LEFT-SIDED LOW BACK PAIN WITHOUT SCIATICA: ICD-10-CM

## 2024-07-19 PROCEDURE — 98941 CHIROPRACT MANJ 3-4 REGIONS: CPT | Performed by: CHIROPRACTOR

## 2024-07-24 ENCOUNTER — APPOINTMENT (OUTPATIENT)
Dept: OBSTETRICS AND GYNECOLOGY | Facility: CLINIC | Age: 39
End: 2024-07-24
Payer: COMMERCIAL

## 2024-07-24 ENCOUNTER — ROUTINE PRENATAL (OUTPATIENT)
Dept: OBSTETRICS AND GYNECOLOGY | Facility: CLINIC | Age: 39
End: 2024-07-24
Payer: COMMERCIAL

## 2024-07-24 VITALS
DIASTOLIC BLOOD PRESSURE: 79 MMHG | BODY MASS INDEX: 38.17 KG/M2 | WEIGHT: 222.38 LBS | SYSTOLIC BLOOD PRESSURE: 118 MMHG

## 2024-07-24 DIAGNOSIS — Z3A.36 36 WEEKS GESTATION OF PREGNANCY (HHS-HCC): Primary | ICD-10-CM

## 2024-07-24 DIAGNOSIS — O26.899 INCREASED URINARY FREQUENCY DURING PREGNANCY (HHS-HCC): ICD-10-CM

## 2024-07-24 DIAGNOSIS — O09.523 MULTIGRAVIDA OF ADVANCED MATERNAL AGE IN THIRD TRIMESTER (HHS-HCC): ICD-10-CM

## 2024-07-24 DIAGNOSIS — Z34.80 SUPERVISION OF OTHER NORMAL PREGNANCY, ANTEPARTUM (HHS-HCC): ICD-10-CM

## 2024-07-24 DIAGNOSIS — R35.0 INCREASED URINARY FREQUENCY DURING PREGNANCY (HHS-HCC): ICD-10-CM

## 2024-07-24 DIAGNOSIS — Z98.890 HISTORY OF LAMINECTOMY: ICD-10-CM

## 2024-07-24 LAB
POC APPEARANCE, URINE: ABNORMAL
POC BILIRUBIN, URINE: NEGATIVE
POC BLOOD, URINE: ABNORMAL
POC COLOR, URINE: YELLOW
POC GLUCOSE, URINE: NEGATIVE MG/DL
POC KETONES, URINE: NEGATIVE MG/DL
POC LEUKOCYTES, URINE: NEGATIVE
POC NITRITE,URINE: NEGATIVE
POC PH, URINE: 6.5 PH
POC PROTEIN, URINE: ABNORMAL MG/DL
POC SPECIFIC GRAVITY, URINE: 1.01
POC UROBILINOGEN, URINE: 0.2 EU/DL

## 2024-07-24 PROCEDURE — 81003 URINALYSIS AUTO W/O SCOPE: CPT | Performed by: ADVANCED PRACTICE MIDWIFE

## 2024-07-24 PROCEDURE — 87081 CULTURE SCREEN ONLY: CPT

## 2024-07-24 PROCEDURE — 0501F PRENATAL FLOW SHEET: CPT | Performed by: ADVANCED PRACTICE MIDWIFE

## 2024-07-24 PROCEDURE — 87086 URINE CULTURE/COLONY COUNT: CPT

## 2024-07-24 NOTE — PROGRESS NOTES
Return OB visit    S: Paige Vieyra is a 38 y.o.  at 36w5d with a working estimated date of delivery of 2024, by Ultrasound who presents for a routine prenatal visit. She denies vaginal bleeding, abdominal pain, leakage of fluid.     Concerns today: Patient complaining of right lower back and urinary frequency. GBS & Labor Consent today.    MIRNA WARREN MA      O: See prenatal flow sheet    A/P:  GBS and consent today  UA trace blood and protein sendurine for culture  Reviewed warning signs and when to call midwife  Follow up in 1 weeks for a routine prenatal visit

## 2024-07-25 ENCOUNTER — TELEPHONE (OUTPATIENT)
Dept: OBSTETRICS AND GYNECOLOGY | Facility: CLINIC | Age: 39
End: 2024-07-25
Payer: COMMERCIAL

## 2024-07-25 LAB — BACTERIA UR CULT: NORMAL

## 2024-07-25 NOTE — TELEPHONE ENCOUNTER
Pt needs last appts made as she is 37 weeks tomorrow. She normally sees Tray gan, who is out this week and the week of 8/5 she is on vacation.   The pt says she can only do Wednesday and Thursday mornings. Wanted to ask KD if there was anywhere I could double book next week, but the following week I'm unsure of where to schedule as she is a midwife pt and I offered Wednesday with Shonda aguirre Dunlap at 1pm, 1:20pm or 8pm and she could not take either of those. Please advise

## 2024-07-26 LAB — GP B STREP GENITAL QL CULT: NORMAL

## 2024-07-31 NOTE — PROGRESS NOTES
"Subjective   Paige Vieyra is a 38 y.o.  at 37w5d with a working estimated date of delivery of 2024, by Ultrasound who presents for a routine prenatal visit.    Patient reports overall feeling well; no concerns or OB complaints.  ###    Objective   Visit Vitals  LMP  (LMP Unknown)   OB Status Pregnant   Smoking Status Never     There were no vitals filed for this visit.   Pregravid Weight/BMI - 88.5 kg (195 lb) / 33.46  Expected Total Weight Gain - 5 kg (11 lb)-9 kg (19 lb)  TW.4 kg (27 lb 6 oz)  Fundal height and FHR per prenatal flowsheet.    Assessment/Plan   Reviewed GBS negative result.   Reviewed negative urine culture done at time of last visit.   Warning signs and symptoms reviewed; patient has emergency answering service phone line number and is aware of when to call.   Remainder of plan per problem list as below.     Medical Problems       Problem List       Supervision of other normal pregnancy, antepartum ([facility])    Overview Addendum 2024 12:56 PM by ANNA Jose     Starting BMI 32.  Covid vaccinated x 2, declines booster.  Flu vaccinated.  Hx of HSV-1, only oral outbreaks per patient.  Will begin ASA at 12 weeks.  It's a GIRL!          Advanced maternal age in multigravida ([facility])    Overview Signed 2023 10:44 AM by ANNA Jose     Declines aneuploidy screening.         History of laminectomy    Overview Signed 2024 12:26 PM by ANNA Jose     Note from PREVIOUS PREGNANCY -- Response from Dr. Mary -- \"She wouldnt benefit from a anesthesia consult. We cant changeher anatomy. Due to scarring on the surgical site epidural spread could be affected. Given that epidural placement is a blind(non imaging guided) procedure, better results than last timecannot be guaranteed. We might get luckier than last time or her scarring might have becomebetter or worse but those are unchangeable factors. We would always try to place " "this epidural at the lowest level(in this case L3-4) which usually provides good pain reliefespecially during the first stage of labor.\" Vijaya Richter 12/15/2022 11:08 AM             F/U in 1 week.    Vijaya Richter, APRN-CNM  "

## 2024-08-01 ENCOUNTER — APPOINTMENT (OUTPATIENT)
Dept: OBSTETRICS AND GYNECOLOGY | Facility: CLINIC | Age: 39
End: 2024-08-01
Payer: COMMERCIAL

## 2024-08-01 DIAGNOSIS — Z3A.37 37 WEEKS GESTATION OF PREGNANCY (HHS-HCC): Primary | ICD-10-CM

## 2024-08-02 ENCOUNTER — HOSPITAL ENCOUNTER (INPATIENT)
Facility: HOSPITAL | Age: 39
End: 2024-08-02
Attending: STUDENT IN AN ORGANIZED HEALTH CARE EDUCATION/TRAINING PROGRAM | Admitting: STUDENT IN AN ORGANIZED HEALTH CARE EDUCATION/TRAINING PROGRAM
Payer: COMMERCIAL

## 2024-08-02 ENCOUNTER — APPOINTMENT (OUTPATIENT)
Dept: OBSTETRICS AND GYNECOLOGY | Facility: CLINIC | Age: 39
End: 2024-08-02
Payer: COMMERCIAL

## 2024-08-02 ENCOUNTER — ANESTHESIA (OUTPATIENT)
Dept: OBSTETRICS AND GYNECOLOGY | Facility: HOSPITAL | Age: 39
End: 2024-08-02
Payer: COMMERCIAL

## 2024-08-02 ENCOUNTER — ROUTINE PRENATAL (OUTPATIENT)
Dept: OBSTETRICS AND GYNECOLOGY | Facility: CLINIC | Age: 39
End: 2024-08-02
Payer: COMMERCIAL

## 2024-08-02 ENCOUNTER — ANESTHESIA EVENT (OUTPATIENT)
Dept: OBSTETRICS AND GYNECOLOGY | Facility: HOSPITAL | Age: 39
End: 2024-08-02
Payer: COMMERCIAL

## 2024-08-02 VITALS — SYSTOLIC BLOOD PRESSURE: 148 MMHG | DIASTOLIC BLOOD PRESSURE: 95 MMHG

## 2024-08-02 DIAGNOSIS — Z3A.38 38 WEEKS GESTATION OF PREGNANCY (HHS-HCC): ICD-10-CM

## 2024-08-02 DIAGNOSIS — O10.013 PRE-EXISTING ESSENTIAL HYPERTENSION DURING PREGNANCY IN THIRD TRIMESTER (HHS-HCC): Primary | ICD-10-CM

## 2024-08-02 LAB
ABO GROUP (TYPE) IN BLOOD: NORMAL
ALBUMIN SERPL BCP-MCNC: 3.1 G/DL (ref 3.4–5)
ALBUMIN SERPL BCP-MCNC: 3.1 G/DL (ref 3.4–5)
ALP SERPL-CCNC: 178 U/L (ref 33–110)
ALP SERPL-CCNC: 185 U/L (ref 33–110)
ALT SERPL W P-5'-P-CCNC: 19 U/L (ref 7–45)
ALT SERPL W P-5'-P-CCNC: 20 U/L (ref 7–45)
ANION GAP SERPL CALC-SCNC: 14 MMOL/L (ref 10–20)
ANION GAP SERPL CALC-SCNC: 17 MMOL/L (ref 10–20)
ANTIBODY SCREEN: NORMAL
AST SERPL W P-5'-P-CCNC: 25 U/L (ref 9–39)
AST SERPL W P-5'-P-CCNC: 25 U/L (ref 9–39)
BILIRUB SERPL-MCNC: 0.4 MG/DL (ref 0–1.2)
BILIRUB SERPL-MCNC: 0.4 MG/DL (ref 0–1.2)
BUN SERPL-MCNC: 10 MG/DL (ref 6–23)
BUN SERPL-MCNC: 8 MG/DL (ref 6–23)
CALCIUM SERPL-MCNC: 8.7 MG/DL (ref 8.6–10.6)
CALCIUM SERPL-MCNC: 9.1 MG/DL (ref 8.6–10.3)
CHLORIDE SERPL-SCNC: 104 MMOL/L (ref 98–107)
CHLORIDE SERPL-SCNC: 105 MMOL/L (ref 98–107)
CO2 SERPL-SCNC: 20 MMOL/L (ref 21–32)
CO2 SERPL-SCNC: 23 MMOL/L (ref 21–32)
CREAT SERPL-MCNC: 0.67 MG/DL (ref 0.5–1.05)
CREAT SERPL-MCNC: 0.75 MG/DL (ref 0.5–1.05)
CREAT UR-MCNC: 37.1 MG/DL (ref 20–320)
EGFRCR SERPLBLD CKD-EPI 2021: >90 ML/MIN/1.73M*2
EGFRCR SERPLBLD CKD-EPI 2021: >90 ML/MIN/1.73M*2
ERYTHROCYTE [DISTWIDTH] IN BLOOD BY AUTOMATED COUNT: 13.6 % (ref 11.5–14.5)
ERYTHROCYTE [DISTWIDTH] IN BLOOD BY AUTOMATED COUNT: 13.6 % (ref 11.5–14.5)
GLUCOSE SERPL-MCNC: 66 MG/DL (ref 74–99)
GLUCOSE SERPL-MCNC: 84 MG/DL (ref 74–99)
HCT VFR BLD AUTO: 37.8 % (ref 36–46)
HCT VFR BLD AUTO: 39 % (ref 36–46)
HGB BLD-MCNC: 12.3 G/DL (ref 12–16)
HGB BLD-MCNC: 12.7 G/DL (ref 12–16)
HOLD SPECIMEN: NORMAL
HOLD SPECIMEN: NORMAL
LDH SERPL L TO P-CCNC: 171 U/L (ref 84–246)
MCH RBC QN AUTO: 27.6 PG (ref 26–34)
MCH RBC QN AUTO: 28 PG (ref 26–34)
MCHC RBC AUTO-ENTMCNC: 32.5 G/DL (ref 32–36)
MCHC RBC AUTO-ENTMCNC: 32.6 G/DL (ref 32–36)
MCV RBC AUTO: 85 FL (ref 80–100)
MCV RBC AUTO: 86 FL (ref 80–100)
NRBC BLD-RTO: 0 /100 WBCS (ref 0–0)
NRBC BLD-RTO: 0 /100 WBCS (ref 0–0)
PLATELET # BLD AUTO: 212 X10*3/UL (ref 150–450)
PLATELET # BLD AUTO: 247 X10*3/UL (ref 150–450)
POTASSIUM SERPL-SCNC: 3.5 MMOL/L (ref 3.5–5.3)
POTASSIUM SERPL-SCNC: 3.8 MMOL/L (ref 3.5–5.3)
PROT SERPL-MCNC: 6.1 G/DL (ref 6.4–8.2)
PROT SERPL-MCNC: 6.2 G/DL (ref 6.4–8.2)
PROT UR-ACNC: 9 MG/DL (ref 5–24)
PROT/CREAT UR: 0.24 MG/MG CREAT (ref 0–0.17)
RBC # BLD AUTO: 4.46 X10*6/UL (ref 4–5.2)
RBC # BLD AUTO: 4.54 X10*6/UL (ref 4–5.2)
RH FACTOR (ANTIGEN D): NORMAL
SODIUM SERPL-SCNC: 137 MMOL/L (ref 136–145)
SODIUM SERPL-SCNC: 138 MMOL/L (ref 136–145)
URATE SERPL-MCNC: 7.9 MG/DL (ref 2.3–6.7)
WBC # BLD AUTO: 7.2 X10*3/UL (ref 4.4–11.3)
WBC # BLD AUTO: 7.4 X10*3/UL (ref 4.4–11.3)

## 2024-08-02 PROCEDURE — 84075 ASSAY ALKALINE PHOSPHATASE: CPT | Performed by: ADVANCED PRACTICE MIDWIFE

## 2024-08-02 PROCEDURE — 85027 COMPLETE CBC AUTOMATED: CPT

## 2024-08-02 PROCEDURE — 84550 ASSAY OF BLOOD/URIC ACID: CPT | Performed by: ADVANCED PRACTICE MIDWIFE

## 2024-08-02 PROCEDURE — 59050 FETAL MONITOR W/REPORT: CPT

## 2024-08-02 PROCEDURE — 0501F PRENATAL FLOW SHEET: CPT | Performed by: ADVANCED PRACTICE MIDWIFE

## 2024-08-02 PROCEDURE — 2500000002 HC RX 250 W HCPCS SELF ADMINISTERED DRUGS (ALT 637 FOR MEDICARE OP, ALT 636 FOR OP/ED): Performed by: STUDENT IN AN ORGANIZED HEALTH CARE EDUCATION/TRAINING PROGRAM

## 2024-08-02 PROCEDURE — 59025 FETAL NON-STRESS TEST: CPT

## 2024-08-02 PROCEDURE — 2500000004 HC RX 250 GENERAL PHARMACY W/ HCPCS (ALT 636 FOR OP/ED): Performed by: ADVANCED PRACTICE MIDWIFE

## 2024-08-02 PROCEDURE — 2500000004 HC RX 250 GENERAL PHARMACY W/ HCPCS (ALT 636 FOR OP/ED)

## 2024-08-02 PROCEDURE — 51702 INSERT TEMP BLADDER CATH: CPT

## 2024-08-02 PROCEDURE — 59025 FETAL NON-STRESS TEST: CPT | Mod: GC | Performed by: STUDENT IN AN ORGANIZED HEALTH CARE EDUCATION/TRAINING PROGRAM

## 2024-08-02 PROCEDURE — 2500000001 HC RX 250 WO HCPCS SELF ADMINISTERED DRUGS (ALT 637 FOR MEDICARE OP)

## 2024-08-02 PROCEDURE — 99199 UNLISTED SPECIAL SVC PX/RPRT: CPT

## 2024-08-02 PROCEDURE — 86901 BLOOD TYPING SEROLOGIC RH(D): CPT

## 2024-08-02 PROCEDURE — 82570 ASSAY OF URINE CREATININE: CPT | Performed by: ADVANCED PRACTICE MIDWIFE

## 2024-08-02 PROCEDURE — 86923 COMPATIBILITY TEST ELECTRIC: CPT

## 2024-08-02 PROCEDURE — 83615 LACTATE (LD) (LDH) ENZYME: CPT | Performed by: ADVANCED PRACTICE MIDWIFE

## 2024-08-02 PROCEDURE — 7210000002 HC LABOR PER HOUR

## 2024-08-02 PROCEDURE — 36415 COLL VENOUS BLD VENIPUNCTURE: CPT | Performed by: ADVANCED PRACTICE MIDWIFE

## 2024-08-02 PROCEDURE — 1120000001 HC OB PRIVATE ROOM DAILY

## 2024-08-02 PROCEDURE — 59025 FETAL NON-STRESS TEST: CPT | Performed by: STUDENT IN AN ORGANIZED HEALTH CARE EDUCATION/TRAINING PROGRAM

## 2024-08-02 PROCEDURE — 59025 FETAL NON-STRESS TEST: CPT | Mod: GC

## 2024-08-02 PROCEDURE — 36415 COLL VENOUS BLD VENIPUNCTURE: CPT

## 2024-08-02 PROCEDURE — 2500000002 HC RX 250 W HCPCS SELF ADMINISTERED DRUGS (ALT 637 FOR MEDICARE OP, ALT 636 FOR OP/ED)

## 2024-08-02 PROCEDURE — 3E0P7VZ INTRODUCTION OF HORMONE INTO FEMALE REPRODUCTIVE, VIA NATURAL OR ARTIFICIAL OPENING: ICD-10-PCS | Performed by: STUDENT IN AN ORGANIZED HEALTH CARE EDUCATION/TRAINING PROGRAM

## 2024-08-02 PROCEDURE — 80053 COMPREHEN METABOLIC PANEL: CPT

## 2024-08-02 PROCEDURE — 85027 COMPLETE CBC AUTOMATED: CPT | Performed by: ADVANCED PRACTICE MIDWIFE

## 2024-08-02 RX ORDER — MAGNESIUM SULFATE HEPTAHYDRATE 40 MG/ML
2 INJECTION, SOLUTION INTRAVENOUS CONTINUOUS
Status: DISCONTINUED | OUTPATIENT
Start: 2024-08-02 | End: 2024-08-05 | Stop reason: HOSPADM

## 2024-08-02 RX ORDER — SODIUM CHLORIDE, SODIUM LACTATE, POTASSIUM CHLORIDE, CALCIUM CHLORIDE 600; 310; 30; 20 MG/100ML; MG/100ML; MG/100ML; MG/100ML
125 INJECTION, SOLUTION INTRAVENOUS CONTINUOUS
Status: DISCONTINUED | OUTPATIENT
Start: 2024-08-02 | End: 2024-08-05 | Stop reason: HOSPADM

## 2024-08-02 RX ORDER — NIFEDIPINE 60 MG/1
60 TABLET, FILM COATED, EXTENDED RELEASE ORAL
Status: DISCONTINUED | OUTPATIENT
Start: 2024-08-03 | End: 2024-08-03 | Stop reason: HOSPADM

## 2024-08-02 RX ORDER — DIPHENHYDRAMINE HYDROCHLORIDE 50 MG/ML
25 INJECTION INTRAMUSCULAR; INTRAVENOUS ONCE
Status: COMPLETED | OUTPATIENT
Start: 2024-08-02 | End: 2024-08-02

## 2024-08-02 RX ORDER — ACETAMINOPHEN 325 MG/1
975 TABLET ORAL ONCE
Status: COMPLETED | OUTPATIENT
Start: 2024-08-02 | End: 2024-08-02

## 2024-08-02 RX ORDER — CALCIUM GLUCONATE 98 MG/ML
1 INJECTION, SOLUTION INTRAVENOUS ONCE AS NEEDED
Status: DISCONTINUED | OUTPATIENT
Start: 2024-08-02 | End: 2024-08-02

## 2024-08-02 RX ORDER — HYDRALAZINE HYDROCHLORIDE 20 MG/ML
5 INJECTION INTRAMUSCULAR; INTRAVENOUS ONCE AS NEEDED
Status: COMPLETED | OUTPATIENT
Start: 2024-08-02 | End: 2024-08-02

## 2024-08-02 RX ORDER — LABETALOL HYDROCHLORIDE 5 MG/ML
20 INJECTION, SOLUTION INTRAVENOUS ONCE AS NEEDED
Status: COMPLETED | OUTPATIENT
Start: 2024-08-02 | End: 2024-08-02

## 2024-08-02 RX ORDER — NIFEDIPINE 30 MG/1
30 TABLET, FILM COATED, EXTENDED RELEASE ORAL
Status: DISCONTINUED | OUTPATIENT
Start: 2024-08-03 | End: 2024-08-02

## 2024-08-02 RX ORDER — LIDOCAINE HYDROCHLORIDE 10 MG/ML
0.5 INJECTION, SOLUTION EPIDURAL; INFILTRATION; INTRACAUDAL; PERINEURAL ONCE AS NEEDED
Status: DISCONTINUED | OUTPATIENT
Start: 2024-08-02 | End: 2024-08-02

## 2024-08-02 RX ORDER — SODIUM CHLORIDE, SODIUM LACTATE, POTASSIUM CHLORIDE, CALCIUM CHLORIDE 600; 310; 30; 20 MG/100ML; MG/100ML; MG/100ML; MG/100ML
75 INJECTION, SOLUTION INTRAVENOUS CONTINUOUS
Status: DISCONTINUED | OUTPATIENT
Start: 2024-08-02 | End: 2024-08-02

## 2024-08-02 RX ORDER — HYDRALAZINE HYDROCHLORIDE 20 MG/ML
5 INJECTION INTRAMUSCULAR; INTRAVENOUS ONCE AS NEEDED
Status: DISCONTINUED | OUTPATIENT
Start: 2024-08-02 | End: 2024-08-03 | Stop reason: HOSPADM

## 2024-08-02 RX ORDER — TRANEXAMIC ACID 100 MG/ML
1000 INJECTION, SOLUTION INTRAVENOUS ONCE AS NEEDED
Status: DISCONTINUED | OUTPATIENT
Start: 2024-08-02 | End: 2024-08-03 | Stop reason: HOSPADM

## 2024-08-02 RX ORDER — NIFEDIPINE 30 MG/1
30 TABLET, FILM COATED, EXTENDED RELEASE ORAL ONCE
Status: COMPLETED | OUTPATIENT
Start: 2024-08-02 | End: 2024-08-02

## 2024-08-02 RX ORDER — OXYTOCIN/0.9 % SODIUM CHLORIDE 30/500 ML
60 PLASTIC BAG, INJECTION (ML) INTRAVENOUS ONCE AS NEEDED
Status: COMPLETED | OUTPATIENT
Start: 2024-08-02 | End: 2024-08-03

## 2024-08-02 RX ORDER — CALCIUM GLUCONATE 98 MG/ML
1 INJECTION, SOLUTION INTRAVENOUS ONCE AS NEEDED
Status: DISCONTINUED | OUTPATIENT
Start: 2024-08-02 | End: 2024-08-03 | Stop reason: HOSPADM

## 2024-08-02 RX ORDER — LOPERAMIDE HYDROCHLORIDE 2 MG/1
4 CAPSULE ORAL EVERY 2 HOUR PRN
Status: DISCONTINUED | OUTPATIENT
Start: 2024-08-02 | End: 2024-08-03 | Stop reason: HOSPADM

## 2024-08-02 RX ORDER — OXYTOCIN 10 [USP'U]/ML
10 INJECTION, SOLUTION INTRAMUSCULAR; INTRAVENOUS ONCE AS NEEDED
Status: DISCONTINUED | OUTPATIENT
Start: 2024-08-02 | End: 2024-08-03 | Stop reason: HOSPADM

## 2024-08-02 RX ORDER — CARBOPROST TROMETHAMINE 250 UG/ML
250 INJECTION, SOLUTION INTRAMUSCULAR ONCE AS NEEDED
Status: DISCONTINUED | OUTPATIENT
Start: 2024-08-02 | End: 2024-08-03 | Stop reason: HOSPADM

## 2024-08-02 RX ORDER — ONDANSETRON 4 MG/1
4 TABLET, FILM COATED ORAL EVERY 6 HOURS PRN
Status: DISCONTINUED | OUTPATIENT
Start: 2024-08-02 | End: 2024-08-02

## 2024-08-02 RX ORDER — ONDANSETRON 4 MG/1
4 TABLET, FILM COATED ORAL EVERY 6 HOURS PRN
Status: DISCONTINUED | OUTPATIENT
Start: 2024-08-02 | End: 2024-08-03 | Stop reason: HOSPADM

## 2024-08-02 RX ORDER — METOCLOPRAMIDE HYDROCHLORIDE 5 MG/ML
10 INJECTION INTRAMUSCULAR; INTRAVENOUS EVERY 6 HOURS PRN
Status: DISCONTINUED | OUTPATIENT
Start: 2024-08-02 | End: 2024-08-03 | Stop reason: HOSPADM

## 2024-08-02 RX ORDER — MAGNESIUM SULFATE HEPTAHYDRATE 40 MG/ML
2 INJECTION, SOLUTION INTRAVENOUS CONTINUOUS
Status: DISCONTINUED | OUTPATIENT
Start: 2024-08-02 | End: 2024-08-02

## 2024-08-02 RX ORDER — METOCLOPRAMIDE 10 MG/1
10 TABLET ORAL EVERY 6 HOURS PRN
Status: DISCONTINUED | OUTPATIENT
Start: 2024-08-02 | End: 2024-08-03 | Stop reason: HOSPADM

## 2024-08-02 RX ORDER — MISOPROSTOL 200 UG/1
800 TABLET ORAL ONCE AS NEEDED
Status: DISCONTINUED | OUTPATIENT
Start: 2024-08-02 | End: 2024-08-03 | Stop reason: HOSPADM

## 2024-08-02 RX ORDER — LABETALOL HYDROCHLORIDE 5 MG/ML
20 INJECTION, SOLUTION INTRAVENOUS ONCE AS NEEDED
Status: DISCONTINUED | OUTPATIENT
Start: 2024-08-02 | End: 2024-08-03 | Stop reason: HOSPADM

## 2024-08-02 RX ORDER — NIFEDIPINE 10 MG/1
10 CAPSULE ORAL ONCE AS NEEDED
Status: DISCONTINUED | OUTPATIENT
Start: 2024-08-02 | End: 2024-08-02

## 2024-08-02 RX ORDER — NIFEDIPINE 10 MG/1
10 CAPSULE ORAL ONCE AS NEEDED
Status: DISCONTINUED | OUTPATIENT
Start: 2024-08-02 | End: 2024-08-03 | Stop reason: HOSPADM

## 2024-08-02 RX ORDER — ONDANSETRON HYDROCHLORIDE 2 MG/ML
4 INJECTION, SOLUTION INTRAVENOUS EVERY 6 HOURS PRN
Status: DISCONTINUED | OUTPATIENT
Start: 2024-08-02 | End: 2024-08-02

## 2024-08-02 RX ORDER — METOCLOPRAMIDE HYDROCHLORIDE 5 MG/ML
10 INJECTION INTRAMUSCULAR; INTRAVENOUS ONCE
Status: COMPLETED | OUTPATIENT
Start: 2024-08-02 | End: 2024-08-02

## 2024-08-02 RX ORDER — TERBUTALINE SULFATE 1 MG/ML
0.25 INJECTION SUBCUTANEOUS ONCE AS NEEDED
Status: DISCONTINUED | OUTPATIENT
Start: 2024-08-02 | End: 2024-08-03 | Stop reason: HOSPADM

## 2024-08-02 RX ORDER — LIDOCAINE HYDROCHLORIDE 10 MG/ML
30 INJECTION INFILTRATION; PERINEURAL ONCE AS NEEDED
Status: COMPLETED | OUTPATIENT
Start: 2024-08-02 | End: 2024-08-03

## 2024-08-02 RX ORDER — ONDANSETRON HYDROCHLORIDE 2 MG/ML
4 INJECTION, SOLUTION INTRAVENOUS EVERY 6 HOURS PRN
Status: DISCONTINUED | OUTPATIENT
Start: 2024-08-02 | End: 2024-08-03 | Stop reason: HOSPADM

## 2024-08-02 RX ORDER — METHYLERGONOVINE MALEATE 0.2 MG/ML
0.2 INJECTION INTRAVENOUS ONCE AS NEEDED
Status: DISCONTINUED | OUTPATIENT
Start: 2024-08-02 | End: 2024-08-03 | Stop reason: HOSPADM

## 2024-08-02 SDOH — SOCIAL STABILITY: SOCIAL INSECURITY: ABUSE SCREEN: ADULT

## 2024-08-02 SDOH — SOCIAL STABILITY: SOCIAL INSECURITY: ARE THERE ANY APPARENT SIGNS OF INJURIES/BEHAVIORS THAT COULD BE RELATED TO ABUSE/NEGLECT?: NO

## 2024-08-02 SDOH — SOCIAL STABILITY: SOCIAL INSECURITY: HAVE YOU HAD ANY THOUGHTS OF HARMING ANYONE ELSE?: NO

## 2024-08-02 SDOH — HEALTH STABILITY: MENTAL HEALTH: CURRENT SMOKER: 0

## 2024-08-02 SDOH — SOCIAL STABILITY: SOCIAL INSECURITY: HAS ANYONE EVER THREATENED TO HURT YOUR FAMILY OR YOUR PETS?: NO

## 2024-08-02 SDOH — SOCIAL STABILITY: SOCIAL INSECURITY: VERBAL ABUSE: DENIES

## 2024-08-02 SDOH — HEALTH STABILITY: MENTAL HEALTH: HAVE YOU USED ANY PRESCRIPTION DRUGS OTHER THAN PRESCRIBED IN THE PAST 12 MONTHS?: NO

## 2024-08-02 SDOH — ECONOMIC STABILITY: HOUSING INSECURITY: DO YOU FEEL UNSAFE GOING BACK TO THE PLACE WHERE YOU ARE LIVING?: NO

## 2024-08-02 SDOH — HEALTH STABILITY: MENTAL HEALTH: NON-SPECIFIC ACTIVE SUICIDAL THOUGHTS (PAST 1 MONTH): NO

## 2024-08-02 SDOH — HEALTH STABILITY: MENTAL HEALTH: WISH TO BE DEAD (PAST 1 MONTH): NO

## 2024-08-02 SDOH — SOCIAL STABILITY: SOCIAL INSECURITY: HAVE YOU HAD THOUGHTS OF HARMING ANYONE ELSE?: NO

## 2024-08-02 SDOH — SOCIAL STABILITY: SOCIAL INSECURITY: ARE YOU OR HAVE YOU BEEN THREATENED OR ABUSED PHYSICALLY, EMOTIONALLY, OR SEXUALLY BY ANYONE?: NO

## 2024-08-02 SDOH — HEALTH STABILITY: MENTAL HEALTH: SUICIDAL BEHAVIOR (LIFETIME): NO

## 2024-08-02 SDOH — SOCIAL STABILITY: SOCIAL INSECURITY: PHYSICAL ABUSE: DENIES

## 2024-08-02 SDOH — SOCIAL STABILITY: SOCIAL INSECURITY: DO YOU FEEL ANYONE HAS EXPLOITED OR TAKEN ADVANTAGE OF YOU FINANCIALLY OR OF YOUR PERSONAL PROPERTY?: NO

## 2024-08-02 SDOH — SOCIAL STABILITY: SOCIAL INSECURITY: DOES ANYONE TRY TO KEEP YOU FROM HAVING/CONTACTING OTHER FRIENDS OR DOING THINGS OUTSIDE YOUR HOME?: NO

## 2024-08-02 SDOH — HEALTH STABILITY: MENTAL HEALTH: HAVE YOU USED ANY SUBSTANCES (CANABIS, COCAINE, HEROIN, HALLUCINOGENS, INHALANTS, ETC.) IN THE PAST 12 MONTHS?: NO

## 2024-08-02 SDOH — HEALTH STABILITY: MENTAL HEALTH: WERE YOU ABLE TO COMPLETE ALL THE BEHAVIORAL HEALTH SCREENINGS?: YES

## 2024-08-02 ASSESSMENT — PAIN SCALES - GENERAL
PAINLEVEL_OUTOF10: 7
PAINLEVEL_OUTOF10: 5 - MODERATE PAIN
PAINLEVEL_OUTOF10: 0 - NO PAIN
PAINLEVEL_OUTOF10: 3
PAINLEVEL_OUTOF10: 5 - MODERATE PAIN

## 2024-08-02 ASSESSMENT — ACTIVITIES OF DAILY LIVING (ADL)
TOILETING: INDEPENDENT
ADEQUATE_TO_COMPLETE_ADL: YES
HEARING - LEFT EAR: FUNCTIONAL
FEEDING YOURSELF: INDEPENDENT
HEARING - RIGHT EAR: FUNCTIONAL
BATHING: INDEPENDENT
JUDGMENT_ADEQUATE_SAFELY_COMPLETE_DAILY_ACTIVITIES: YES
PATIENT'S MEMORY ADEQUATE TO SAFELY COMPLETE DAILY ACTIVITIES?: YES
GROOMING: INDEPENDENT
DRESSING YOURSELF: INDEPENDENT
LACK_OF_TRANSPORTATION: NO
WALKS IN HOME: INDEPENDENT

## 2024-08-02 ASSESSMENT — LIFESTYLE VARIABLES
HOW OFTEN DO YOU HAVE 6 OR MORE DRINKS ON ONE OCCASION: NEVER
AUDIT-C TOTAL SCORE: 0
SKIP TO QUESTIONS 9-10: 1
HOW OFTEN DO YOU HAVE A DRINK CONTAINING ALCOHOL: NEVER
AUDIT-C TOTAL SCORE: 0
HOW MANY STANDARD DRINKS CONTAINING ALCOHOL DO YOU HAVE ON A TYPICAL DAY: PATIENT DOES NOT DRINK

## 2024-08-02 ASSESSMENT — PATIENT HEALTH QUESTIONNAIRE - PHQ9
SUM OF ALL RESPONSES TO PHQ9 QUESTIONS 1 & 2: 0
2. FEELING DOWN, DEPRESSED OR HOPELESS: NOT AT ALL
1. LITTLE INTEREST OR PLEASURE IN DOING THINGS: NOT AT ALL

## 2024-08-02 NOTE — H&P
"Obstetrical Admission History and Physical     Paige Vieyra is a 38 y.o.  at 38w0d. KIRSTEN: 2024, by Ultrasound. She has had prenatal care with RAY Richter .    Chief Complaint: Blood Pressure Check (Office today had increased b/p, no other syptoms)    Assessment/Plan    sPEC  -Diagnosed by severe range BPs requiring IV treatment  -s/p IV hydral 5 and IV labetalol 20mg (last at 1700)  -PO Nifedipine 30mg daily started  -HELLP labs neg, P:C pending  -Asymptomatic  -Mg @ 2g/hr  -Recommend transfer to [facility] for IOL for sPEC at term. Patient agreeable. Transfer accepted by Dr. Loera    Fetal Well-Being  -NST reactive  -GBS neg  -Cephalic confirmed on admission     Latesha Castanon MD     Active Problems:  There are no active Hospital Problems.      Pregnancy Problems (from 22 to present)       Problem Noted Resolved    History of laminectomy 2024 by ANNA Jose No    Priority:  Medium      Overview Signed 2024 12:26 PM by ANNA Jose     Note from PREVIOUS PREGNANCY -- Response from Dr. Mary -- \"She wouldnt benefit from a anesthesia consult. We cant changeher anatomy. Due to scarring on the surgical site epidural spread could be affected. Given that epidural placement is a blind(non imaging guided) procedure, better results than last timecannot be guaranteed. We might get luckier than last time or her scarring might have becomebetter or worse but those are unchangeable factors. We would always try to place this epidural at the lowest level(in this case L3-4) which usually provides good pain reliefespecially during the first stage of labor.\" Vijaya Richter 12/15/2022 11:08 AM         Supervision of other normal pregnancy, antepartum ([facility]) 2023 by ANNA Jose No    Priority:  Medium      Overview Addendum 2024 12:56 PM by ANNA Jose     Starting BMI 32.  Covid vaccinated x 2, declines booster.  Flu " vaccinated.  Hx of HSV-1, only oral outbreaks per patient.  Will begin ASA at 12 weeks.  It's a GIRL!          Advanced maternal age in multigravida ([facility]) 2023 by ANNA Jose No    Priority:  Medium      Overview Signed 2023 10:44 AM by ANNA Jose     Declines aneuploidy screening.               Admit for inpatient care.  Diagnosis:   Diagnosed based on: severe range blood pressures requiring immediate treatment  Blood pressure goal: <160/110  Short acting medications received? IV hydralazine, dose given: 1546 and IV labetalol, dose given: 1703    Long acting antihypertensive: PO  Nifedipine  HELLP labs: normal   Protein:Cr ratio: pending  Delivery planning: IOL   corticosteroids: indicated  Magnesium for seizure prophylaxis: indicated  GBS prophylaxis: not indicated    Subjective   Marvalyn is here complaining of high blood pressure. Sent in from office for mild range BP. Denies HA, scotoma, SOB, or RUQ pain. Good FM, no OB complaints.    Pregnancy notable for:  -AMA  -History of laminectomy s/p anesthesia consult in      Obstetrical History   OB History    Para Term  AB Living   5 4 3 1 0 3   SAB IAB Ectopic Multiple Live Births   0 0 0 0 3      # Outcome Date GA Lbr Manuel/2nd Weight Sex Type Anes PTL Lv   5 Current            4 Term 23   3.657 kg M Vag-Spont EPI N JAYSON   3 Term 05/10/18 40w0d  2.892 kg M Vag-Spont EPI N JAYSON   2 Term 02 41w0d  3.147 kg F Vag-Spont EPI N JAYSON   1   24w0d              Past Medical History  Past Medical History:   Diagnosis Date    Abnormal dilatation of cervix before onset of labor ([facility])     Abnormal Pap smear of cervix     Phlebitis      labor ([facility])     Sickle cell anemia (Multi)     Urinary tract infection         Past Surgical History   Past Surgical History:   Procedure Laterality Date    COLPOSCOPY      OTHER SURGICAL HISTORY  2020    Colposcopy    OTHER  SURGICAL HISTORY  02/04/2020    Cystoscopy    OTHER SURGICAL HISTORY  02/04/2020    Laminectomy       Social History  Social History     Tobacco Use    Smoking status: Never    Smokeless tobacco: Never   Substance Use Topics    Alcohol use: Not Currently     Comment: Occasional     Substance and Sexual Activity   Drug Use Never       Allergies  Sulfamethoxazole, Codeine, Sulfamethoxazole-trimethoprim, and Vancomycin     Medications  Medications Prior to Admission   Medication Sig Dispense Refill Last Dose    ascorbic acid (Vitamin C) 250 mg tablet Take 1 tablet (250 mg) by mouth once daily.   8/2/2024 at 1000    aspirin 81 mg EC tablet Take 2 tablets (162 mg) by mouth once daily. 60 tablet 11 8/2/2024 at 1000    BACILLUS COAGULANS-INULIN ORAL Take by mouth.   8/2/2024 at 1000    DIETARY SUPPLEMENT ORAL Take by mouth. OX BILE   8/2/2024 at 1000    omega 3-dha-epa-fish oil (Fish OiL) 1,000 mg (120 mg-180 mg) capsule Take by mouth.   8/2/2024 at 1000    ondansetron (Zofran) 4 mg tablet Take 1 tablet (4 mg) by mouth every 8 hours if needed for nausea or vomiting. 30 tablet 0 Past Week    prenatal19-iron-folic-omega3 29-1-400 mg combo pack,tablet and cap,DR Take by mouth.   8/2/2024 at 1000    biotin 10 mg tablet Take by mouth.   7/26/2024 at 1000    triamcinolone (Kenalog) 0.1 % ointment Apply topically 2 times a day. (Patient not taking: Reported on 8/2/2024) 15 g 0 More than a month       Objective    Last Vitals  Temp Pulse Resp BP MAP O2 Sat     82   (!) 145/88 112 99 %     Physical Examination  Gen: awake, alert  Head: NCAT  HEENT: moist mucus membranes  Pulm: breathing comfortably on room air  CV: warm and well-perfused  Abd: gravid  Neuro: alert and oriented  Psych: appropriate affect     FHT: 125, moderate variability, + accels, -decels  TOCO: ctx q10 min  BSUS: cephalic per CNM Eusebioel    Lab Review  Labs in chart were reviewed.

## 2024-08-02 NOTE — NURSING NOTE
This writer arrives at bedside at this time, as directed by Charge Nurse , with order to begin Magnesium per written provider order. Second RN Andreina accompanies this RN at bedside to verify and begin mag bolus.

## 2024-08-02 NOTE — PROGRESS NOTES
Subjective   Patient ID [id number]   Paige Vieyra is a 38 y.o.  at 38w0d with a working estimated date of delivery of 2024, by Ultrasound who presents for a routine prenatal visit. She denies vaginal bleeding, contractions or leaking of fluid.  Elevated b/p  complaints  of swelling today denies headache visual changes or ruq pain    Objective   Physical Exam:     Constitutional: Alert and oriented, cooperative, no acute distress, well nourished, well hydrated     HEENT: normal    Neuropsych: normal affect, well groomed, good eye contact           , Pregravid BMI: 33.46  Expected Total Weight Gain: 5 kg (11 lb)-9 kg (19 lb)     Assessment/Plan   Sent to labor and deliver for IOL

## 2024-08-02 NOTE — ANESTHESIA PREPROCEDURE EVALUATION
Patient: Paige Vieyra    Evaluation Method: In-person visit    Procedure Information    Date: 08/02/24  Procedure: Labor Consult         Relevant Problems   GYN   (+) Advanced maternal age in multigravida ([facility])   (+) Supervision of other normal pregnancy, antepartum ([facility])       Clinical information reviewed:   Tobacco  Allergies  Meds   Med Hx  Surg Hx   Fam Hx  Soc Hx        NPO Detail:  No data recorded     OB/Gyn Evaluation    Present Pregnancy    Patient is pregnant now.   Obstetric History                Physical Exam    Airway  Mallampati: II  TM distance: >3 FB  Neck ROM: full     Cardiovascular - normal exam     Dental    Pulmonary - normal exam     Abdominal            Anesthesia Plan    History of general anesthesia?: yes  History of complications of general anesthesia?: no    ASA 2     epidural   (I informed and discussed the risks and benefits of general, spinal and epidural anesthesia with the patient.  The patient expressed her understanding and her questions were answered.  A verbal consent was given by the patient.  Patient has no history of problems with anesthesia  Patient has no family history of problems with anesthesia)  The patient is not a current smoker.    Anesthetic plan and risks discussed with patient.  Use of blood products discussed with patient who consented to blood products.

## 2024-08-02 NOTE — H&P
"Obstetrical Admission History and Physical    Assessment/Plan    Paige Vieyra is a 38 y.o.  at 38w0d, KIRSTEN: 2024, by Ultrasound admitted for IOL in the setting of SPEC    IOL  Admitted, consented, scanned, cephalic  SVE: 3  Will induce with CRB/Cyto  Epidural as requested  GBS negative  EFW 8.5# by leopolds, 68%  CEFM, currently Cat I  Delivery plan: patient desires vaginal delivery, patient counseled on risks of labor, vaginal delivery, and possibility of C/S for varying maternal or fetal indications    SPEC  - Diagnosed by SRBPs requiring IV tx  - Last tx IV hydral 5 and IV labetalol 20mg (last at 1700)  - HELLP labs neg x1, repeat set in 6 hrs  - Nifed 30mg  - Asx    Pregnancy notables:   Hx laminectomy 10 years ago, for anesthesia consult while admitted    Postpartum planning:  Contraception: interval Nuva ring    Patient seen and discussed with Dr. Sameer Cornell MD  PGY-2 OBGYN    Subjective   Seen at bedside in [facility]. Denies HA/Vision changes, CP/SOB, RUQ pain.    Pregnancy notable for:  Medical Problems       Problem List       * (Principal) Labor and delivery, indication for care ([facility])    Supervision of other normal pregnancy, antepartum ([facility])    Overview Addendum 2024 12:56 PM by ANNA Jose     Starting BMI 32.  Covid vaccinated x 2, declines booster.  Flu vaccinated.  Hx of HSV-1, only oral outbreaks per patient.  Will begin ASA at 12 weeks.  It's a GIRL!          Advanced maternal age in multigravida ([facility])    Overview Signed 2023 10:44 AM by ANNA Jose     Declines aneuploidy screening.         History of laminectomy    Overview Signed 2024 12:26 PM by ANNA Jose     Note from PREVIOUS PREGNANCY -- Response from Dr. Mary -- \"She wouldnt benefit from a anesthesia consult. We cant changeher anatomy. Due to scarring on the surgical site epidural spread could be affected. Given that epidural " "placement is a blind(non imaging guided) procedure, better results than last timecannot be guaranteed. We might get luckier than last time or her scarring might have becomebetter or worse but those are unchangeable factors. We would always try to place this epidural at the lowest level(in this case L3-4) which usually provides good pain reliefespecially during the first stage of labor.\" Vijaya Richter 12/15/2022 11:08 AM               Obstetrical History   OB History    Para Term  AB Living   5 4 4 0 0 4   SAB IAB Ectopic Multiple Live Births   0 0 0 0 4      # Outcome Date GA Lbr Manuel/2nd Weight Sex Type Anes PTL Lv   5 Current            4 Term 23   3.657 kg M Vag-Spont EPI N JAYSON   3 Term 05/10/18 40w0d  2.892 kg M Vag-Spont EPI N JAYSON   2 Term 02 41w0d  3.147 kg F Vag-Spont EPI N JAYSON   1 Term 1998        JAYSON       Past Medical History  Past Medical History:   Diagnosis Date    Abnormal dilatation of cervix before onset of labor ([facility])     Abnormal Pap smear of cervix     Phlebitis      labor ([facility])     Sickle cell anemia (Multi)     Urinary tract infection         Past Surgical History   Past Surgical History:   Procedure Laterality Date    COLPOSCOPY      OTHER SURGICAL HISTORY  2020    Colposcopy    OTHER SURGICAL HISTORY  2020    Cystoscopy    OTHER SURGICAL HISTORY  2020    Laminectomy       Social History  Social History     Tobacco Use    Smoking status: Never    Smokeless tobacco: Never   Substance Use Topics    Alcohol use: Not Currently     Comment: Occasional     Substance and Sexual Activity   Drug Use Never       Allergies  Sulfamethoxazole, Codeine, Sulfamethoxazole-trimethoprim, and Vancomycin     Medications  Medications Prior to Admission   Medication Sig Dispense Refill Last Dose    ascorbic acid (Vitamin C) 250 mg tablet Take 1 tablet (250 mg) by mouth once daily.   2024 at 1000    aspirin 81 mg EC tablet Take 2 tablets (162 mg) by " mouth once daily. 60 tablet 11 8/2/2024 at 1000    BACILLUS COAGULANS-INULIN ORAL Take by mouth.   8/2/2024 at 1000    DIETARY SUPPLEMENT ORAL Take by mouth. OX BILE   8/2/2024 at 1000    omega 3-dha-epa-fish oil (Fish OiL) 1,000 mg (120 mg-180 mg) capsule Take by mouth.   8/2/2024 at 1000    ondansetron (Zofran) 4 mg tablet Take 1 tablet (4 mg) by mouth every 8 hours if needed for nausea or vomiting. 30 tablet 0 Past Week    prenatal19-iron-folic-omega3 29-1-400 mg combo pack,tablet and cap,DR Take by mouth.   8/2/2024 at 1000    biotin 10 mg tablet Take by mouth.   7/26/2024 at 1000    triamcinolone (Kenalog) 0.1 % ointment Apply topically 2 times a day. (Patient not taking: Reported on 8/2/2024) 15 g 0 More than a month       Objective    Last Vitals  Temp Pulse Resp BP MAP O2 Sat   36.5 °C (97.7 °F) 68 18 (!) 150/89   100 %     Physical Examination  General: no acute distress  HEENT: normocephalic, atraumatic  Heart: warm and well perfused  Lungs: breathing comfortably on room air  Abdomen: gravid  Extremities: moving all extremities  Neuro: awake and conversant  Psych: appropriate mood and affect  SVE: 2/30/-3  FHT: 133/mod/+accel/-decel  [place]: irritable contractions  BSUS: cephalic    Lab Review  Labs in chart have been reviewed.

## 2024-08-02 NOTE — PROCEDURES
Paige Vieyra, a  at 38w0d with an KIRSTEN of 2024, by Ultrasound, was seen at [facility] for a nonstress test.    Non-Stress Test   Baseline Fetal Heart Rate for Non-Stress Test: 125 BPM  Variability in Waveform for Non-Stress Test: Moderate  Accelerations in Non-Stress Test: Yes  Decelerations in Non-Stress Test: None  Contractions in Non-Stress Test: Irregular  NST Contraction Frequency: Every 10 minutes  Acoustic Stimulator for Non-Stress Test: No  Interpretation of Non-Stress Test   Interpretation of Non-Stress Test: Reactive         Latesha Castanon MD

## 2024-08-03 ENCOUNTER — ANESTHESIA EVENT (OUTPATIENT)
Dept: OBSTETRICS AND GYNECOLOGY | Facility: HOSPITAL | Age: 39
End: 2024-08-03
Payer: COMMERCIAL

## 2024-08-03 ENCOUNTER — ANESTHESIA (OUTPATIENT)
Dept: OBSTETRICS AND GYNECOLOGY | Facility: HOSPITAL | Age: 39
End: 2024-08-03
Payer: COMMERCIAL

## 2024-08-03 PROBLEM — K21.9 GASTROESOPHAGEAL REFLUX DISEASE: Status: ACTIVE | Noted: 2024-08-03

## 2024-08-03 LAB
ANION GAP SERPL CALC-SCNC: 13 MMOL/L (ref 10–20)
APTT PPP: 27 SECONDS (ref 27–38)
BUN SERPL-MCNC: 5 MG/DL (ref 6–23)
CALCIUM SERPL-MCNC: 6.8 MG/DL (ref 8.6–10.6)
CHLORIDE SERPL-SCNC: 106 MMOL/L (ref 98–107)
CO2 SERPL-SCNC: 21 MMOL/L (ref 21–32)
CREAT SERPL-MCNC: 0.69 MG/DL (ref 0.5–1.05)
EGFRCR SERPLBLD CKD-EPI 2021: >90 ML/MIN/1.73M*2
ERYTHROCYTE [DISTWIDTH] IN BLOOD BY AUTOMATED COUNT: 13.6 % (ref 11.5–14.5)
FIBRINOGEN PPP-MCNC: 297 MG/DL (ref 200–400)
GLUCOSE SERPL-MCNC: 125 MG/DL (ref 74–99)
HCT VFR BLD AUTO: 31.4 % (ref 36–46)
HGB BLD-MCNC: 10.2 G/DL (ref 12–16)
INR PPP: 0.9 (ref 0.9–1.1)
MAGNESIUM SERPL-MCNC: 6 MG/DL (ref 1.6–2.4)
MCH RBC QN AUTO: 27.9 PG (ref 26–34)
MCHC RBC AUTO-ENTMCNC: 32.5 G/DL (ref 32–36)
MCV RBC AUTO: 86 FL (ref 80–100)
NRBC BLD-RTO: 0 /100 WBCS (ref 0–0)
PLATELET # BLD AUTO: 225 X10*3/UL (ref 150–450)
POTASSIUM SERPL-SCNC: 3.8 MMOL/L (ref 3.5–5.3)
PROTHROMBIN TIME: 9.6 SECONDS (ref 9.8–12.8)
RBC # BLD AUTO: 3.65 X10*6/UL (ref 4–5.2)
SODIUM SERPL-SCNC: 136 MMOL/L (ref 136–145)
WBC # BLD AUTO: 16.1 X10*3/UL (ref 4.4–11.3)

## 2024-08-03 PROCEDURE — 1220000001 HC OB SEMI-PRIVATE ROOM DAILY

## 2024-08-03 PROCEDURE — 7100000016 HC LABOR RECOVERY PER HOUR: Performed by: OBSTETRICS & GYNECOLOGY

## 2024-08-03 PROCEDURE — 59409 OBSTETRICAL CARE: CPT | Performed by: STUDENT IN AN ORGANIZED HEALTH CARE EDUCATION/TRAINING PROGRAM

## 2024-08-03 PROCEDURE — 2500000004 HC RX 250 GENERAL PHARMACY W/ HCPCS (ALT 636 FOR OP/ED)

## 2024-08-03 PROCEDURE — 99199 UNLISTED SPECIAL SVC PX/RPRT: CPT

## 2024-08-03 PROCEDURE — 85027 COMPLETE CBC AUTOMATED: CPT

## 2024-08-03 PROCEDURE — 7210000002 HC LABOR PER HOUR

## 2024-08-03 PROCEDURE — 2500000004 HC RX 250 GENERAL PHARMACY W/ HCPCS (ALT 636 FOR OP/ED): Performed by: STUDENT IN AN ORGANIZED HEALTH CARE EDUCATION/TRAINING PROGRAM

## 2024-08-03 PROCEDURE — 85610 PROTHROMBIN TIME: CPT

## 2024-08-03 PROCEDURE — 2500000005 HC RX 250 GENERAL PHARMACY W/O HCPCS

## 2024-08-03 PROCEDURE — 59812 TREATMENT OF MISCARRIAGE: CPT | Performed by: OBSTETRICS & GYNECOLOGY

## 2024-08-03 PROCEDURE — 2500000005 HC RX 250 GENERAL PHARMACY W/O HCPCS: Performed by: STUDENT IN AN ORGANIZED HEALTH CARE EDUCATION/TRAINING PROGRAM

## 2024-08-03 PROCEDURE — 59410 OBSTETRICAL CARE: CPT | Performed by: STUDENT IN AN ORGANIZED HEALTH CARE EDUCATION/TRAINING PROGRAM

## 2024-08-03 PROCEDURE — 01967 NEURAXL LBR ANES VAG DLVR: CPT | Performed by: ANESTHESIOLOGY

## 2024-08-03 PROCEDURE — 80048 BASIC METABOLIC PNL TOTAL CA: CPT | Mod: CCI | Performed by: STUDENT IN AN ORGANIZED HEALTH CARE EDUCATION/TRAINING PROGRAM

## 2024-08-03 PROCEDURE — 0UH97HZ INSERTION OF CONTRACEPTIVE DEVICE INTO UTERUS, VIA NATURAL OR ARTIFICIAL OPENING: ICD-10-PCS | Performed by: STUDENT IN AN ORGANIZED HEALTH CARE EDUCATION/TRAINING PROGRAM

## 2024-08-03 PROCEDURE — 7100000016 HC LABOR RECOVERY PER HOUR

## 2024-08-03 PROCEDURE — P9045 ALBUMIN (HUMAN), 5%, 250 ML: HCPCS | Mod: JZ | Performed by: STUDENT IN AN ORGANIZED HEALTH CARE EDUCATION/TRAINING PROGRAM

## 2024-08-03 PROCEDURE — 80048 BASIC METABOLIC PNL TOTAL CA: CPT

## 2024-08-03 PROCEDURE — 36415 COLL VENOUS BLD VENIPUNCTURE: CPT

## 2024-08-03 PROCEDURE — 85384 FIBRINOGEN ACTIVITY: CPT

## 2024-08-03 PROCEDURE — 10D17ZZ EXTRACTION OF PRODUCTS OF CONCEPTION, RETAINED, VIA NATURAL OR ARTIFICIAL OPENING: ICD-10-PCS | Performed by: OBSTETRICS & GYNECOLOGY

## 2024-08-03 PROCEDURE — 10907ZC DRAINAGE OF AMNIOTIC FLUID, THERAPEUTIC FROM PRODUCTS OF CONCEPTION, VIA NATURAL OR ARTIFICIAL OPENING: ICD-10-PCS | Performed by: STUDENT IN AN ORGANIZED HEALTH CARE EDUCATION/TRAINING PROGRAM

## 2024-08-03 PROCEDURE — 58300 INSERT INTRAUTERINE DEVICE: CPT | Performed by: STUDENT IN AN ORGANIZED HEALTH CARE EDUCATION/TRAINING PROGRAM

## 2024-08-03 PROCEDURE — 51702 INSERT TEMP BLADDER CATH: CPT

## 2024-08-03 PROCEDURE — 83735 ASSAY OF MAGNESIUM: CPT

## 2024-08-03 PROCEDURE — 2500000001 HC RX 250 WO HCPCS SELF ADMINISTERED DRUGS (ALT 637 FOR MEDICARE OP): Performed by: STUDENT IN AN ORGANIZED HEALTH CARE EDUCATION/TRAINING PROGRAM

## 2024-08-03 PROCEDURE — 3700000014 HC AN EPIDURAL BLOCK CHARGE: Performed by: OBSTETRICS & GYNECOLOGY

## 2024-08-03 PROCEDURE — 2500000002 HC RX 250 W HCPCS SELF ADMINISTERED DRUGS (ALT 637 FOR MEDICARE OP, ALT 636 FOR OP/ED)

## 2024-08-03 PROCEDURE — 3700000014 EPIDURAL BLOCK: Mod: GC

## 2024-08-03 RX ORDER — HYDRALAZINE HYDROCHLORIDE 20 MG/ML
5 INJECTION INTRAMUSCULAR; INTRAVENOUS ONCE AS NEEDED
Status: DISCONTINUED | OUTPATIENT
Start: 2024-08-03 | End: 2024-08-05 | Stop reason: HOSPADM

## 2024-08-03 RX ORDER — LOPERAMIDE HYDROCHLORIDE 2 MG/1
4 CAPSULE ORAL EVERY 2 HOUR PRN
Status: DISCONTINUED | OUTPATIENT
Start: 2024-08-03 | End: 2024-08-05 | Stop reason: HOSPADM

## 2024-08-03 RX ORDER — SODIUM CITRATE AND CITRIC ACID MONOHYDRATE 334; 500 MG/5ML; MG/5ML
SOLUTION ORAL AS NEEDED
Status: DISCONTINUED | OUTPATIENT
Start: 2024-08-03 | End: 2024-08-03

## 2024-08-03 RX ORDER — DIPHENHYDRAMINE HCL 25 MG
25 CAPSULE ORAL EVERY 6 HOURS PRN
Status: DISCONTINUED | OUTPATIENT
Start: 2024-08-03 | End: 2024-08-05 | Stop reason: HOSPADM

## 2024-08-03 RX ORDER — OXYTOCIN 10 [USP'U]/ML
10 INJECTION, SOLUTION INTRAMUSCULAR; INTRAVENOUS ONCE AS NEEDED
Status: DISCONTINUED | OUTPATIENT
Start: 2024-08-03 | End: 2024-08-05 | Stop reason: HOSPADM

## 2024-08-03 RX ORDER — NIFEDIPINE 30 MG/1
30 TABLET, FILM COATED, EXTENDED RELEASE ORAL
Status: DISCONTINUED | OUTPATIENT
Start: 2024-08-04 | End: 2024-08-05 | Stop reason: HOSPADM

## 2024-08-03 RX ORDER — METOCLOPRAMIDE HYDROCHLORIDE 5 MG/ML
10 INJECTION INTRAMUSCULAR; INTRAVENOUS ONCE
Status: COMPLETED | OUTPATIENT
Start: 2024-08-03 | End: 2024-08-03

## 2024-08-03 RX ORDER — FENTANYL CITRATE 50 UG/ML
INJECTION, SOLUTION INTRAMUSCULAR; INTRAVENOUS AS NEEDED
Status: DISCONTINUED | OUTPATIENT
Start: 2024-08-03 | End: 2024-08-03

## 2024-08-03 RX ORDER — BISACODYL 10 MG/1
10 SUPPOSITORY RECTAL DAILY PRN
Status: DISCONTINUED | OUTPATIENT
Start: 2024-08-03 | End: 2024-08-05 | Stop reason: HOSPADM

## 2024-08-03 RX ORDER — METOCLOPRAMIDE HYDROCHLORIDE 5 MG/ML
10 INJECTION INTRAMUSCULAR; INTRAVENOUS ONCE
Status: DISCONTINUED | OUTPATIENT
Start: 2024-08-03 | End: 2024-08-03

## 2024-08-03 RX ORDER — FAMOTIDINE 10 MG/ML
INJECTION INTRAVENOUS AS NEEDED
Status: DISCONTINUED | OUTPATIENT
Start: 2024-08-03 | End: 2024-08-03

## 2024-08-03 RX ORDER — NORETHINDRONE AND ETHINYL ESTRADIOL 0.5-0.035
KIT ORAL AS NEEDED
Status: DISCONTINUED | OUTPATIENT
Start: 2024-08-03 | End: 2024-08-03

## 2024-08-03 RX ORDER — FENTANYL/BUPIVACAINE/NS/PF 2MCG/ML-.1
PLASTIC BAG, INJECTION (ML) INJECTION AS NEEDED
Status: DISCONTINUED | OUTPATIENT
Start: 2024-08-03 | End: 2024-08-03

## 2024-08-03 RX ORDER — FENTANYL/BUPIVACAINE/NS/PF 2MCG/ML-.1
0-54 PLASTIC BAG, INJECTION (ML) INJECTION CONTINUOUS
Status: DISCONTINUED | OUTPATIENT
Start: 2024-08-03 | End: 2024-08-03

## 2024-08-03 RX ORDER — BUPIVACAINE HYDROCHLORIDE 7.5 MG/ML
INJECTION, SOLUTION EPIDURAL; RETROBULBAR AS NEEDED
Status: DISCONTINUED | OUTPATIENT
Start: 2024-08-03 | End: 2024-08-03

## 2024-08-03 RX ORDER — PHENYLEPHRINE HCL IN 0.9% NACL 1 MG/10 ML
SYRINGE (ML) INTRAVENOUS AS NEEDED
Status: DISCONTINUED | OUTPATIENT
Start: 2024-08-03 | End: 2024-08-03

## 2024-08-03 RX ORDER — NIFEDIPINE 10 MG/1
10 CAPSULE ORAL ONCE AS NEEDED
Status: DISCONTINUED | OUTPATIENT
Start: 2024-08-03 | End: 2024-08-05 | Stop reason: HOSPADM

## 2024-08-03 RX ORDER — MISOPROSTOL 200 UG/1
800 TABLET ORAL ONCE
Status: COMPLETED | OUTPATIENT
Start: 2024-08-03 | End: 2024-08-03

## 2024-08-03 RX ORDER — ACETAMINOPHEN 325 MG/1
975 TABLET ORAL ONCE
Status: COMPLETED | OUTPATIENT
Start: 2024-08-03 | End: 2024-08-03

## 2024-08-03 RX ORDER — PHENYLEPHRINE HYDROCHLORIDE 10 MG/ML
INJECTION INTRAVENOUS AS NEEDED
Status: DISCONTINUED | OUTPATIENT
Start: 2024-08-03 | End: 2024-08-03

## 2024-08-03 RX ORDER — ACETAMINOPHEN 325 MG/1
975 TABLET ORAL EVERY 6 HOURS
Status: DISCONTINUED | OUTPATIENT
Start: 2024-08-03 | End: 2024-08-05 | Stop reason: HOSPADM

## 2024-08-03 RX ORDER — LIDOCAINE HCL/EPINEPHRINE/PF 2%-1:200K
VIAL (ML) INJECTION AS NEEDED
Status: DISCONTINUED | OUTPATIENT
Start: 2024-08-03 | End: 2024-08-03

## 2024-08-03 RX ORDER — TRANEXAMIC ACID 100 MG/ML
1000 INJECTION, SOLUTION INTRAVENOUS ONCE AS NEEDED
Status: DISCONTINUED | OUTPATIENT
Start: 2024-08-03 | End: 2024-08-05 | Stop reason: HOSPADM

## 2024-08-03 RX ORDER — OXYTOCIN/0.9 % SODIUM CHLORIDE 30/500 ML
60 PLASTIC BAG, INJECTION (ML) INTRAVENOUS ONCE AS NEEDED
Status: COMPLETED | OUTPATIENT
Start: 2024-08-03 | End: 2024-08-03

## 2024-08-03 RX ORDER — LABETALOL HYDROCHLORIDE 5 MG/ML
20 INJECTION, SOLUTION INTRAVENOUS ONCE AS NEEDED
Status: DISCONTINUED | OUTPATIENT
Start: 2024-08-03 | End: 2024-08-05 | Stop reason: HOSPADM

## 2024-08-03 RX ORDER — METHYLERGONOVINE MALEATE 0.2 MG/ML
0.2 INJECTION INTRAVENOUS ONCE AS NEEDED
Status: DISCONTINUED | OUTPATIENT
Start: 2024-08-03 | End: 2024-08-05 | Stop reason: HOSPADM

## 2024-08-03 RX ORDER — METOCLOPRAMIDE HYDROCHLORIDE 5 MG/ML
INJECTION INTRAMUSCULAR; INTRAVENOUS AS NEEDED
Status: DISCONTINUED | OUTPATIENT
Start: 2024-08-03 | End: 2024-08-03

## 2024-08-03 RX ORDER — ONDANSETRON HYDROCHLORIDE 2 MG/ML
INJECTION, SOLUTION INTRAVENOUS AS NEEDED
Status: DISCONTINUED | OUTPATIENT
Start: 2024-08-03 | End: 2024-08-03

## 2024-08-03 RX ORDER — ENOXAPARIN SODIUM 100 MG/ML
40 INJECTION SUBCUTANEOUS EVERY 24 HOURS
Status: DISCONTINUED | OUTPATIENT
Start: 2024-08-04 | End: 2024-08-04

## 2024-08-03 RX ORDER — LIDOCAINE 560 MG/1
1 PATCH PERCUTANEOUS; TOPICAL; TRANSDERMAL
Status: DISCONTINUED | OUTPATIENT
Start: 2024-08-03 | End: 2024-08-05 | Stop reason: HOSPADM

## 2024-08-03 RX ORDER — ONDANSETRON HYDROCHLORIDE 2 MG/ML
4 INJECTION, SOLUTION INTRAVENOUS EVERY 6 HOURS PRN
Status: DISCONTINUED | OUTPATIENT
Start: 2024-08-03 | End: 2024-08-05 | Stop reason: HOSPADM

## 2024-08-03 RX ORDER — MISOPROSTOL 200 UG/1
800 TABLET ORAL ONCE AS NEEDED
Status: DISCONTINUED | OUTPATIENT
Start: 2024-08-03 | End: 2024-08-05 | Stop reason: HOSPADM

## 2024-08-03 RX ORDER — DIPHENHYDRAMINE HYDROCHLORIDE 50 MG/ML
25 INJECTION INTRAMUSCULAR; INTRAVENOUS EVERY 6 HOURS PRN
Status: DISCONTINUED | OUTPATIENT
Start: 2024-08-03 | End: 2024-08-05 | Stop reason: HOSPADM

## 2024-08-03 RX ORDER — ALBUMIN HUMAN 50 G/1000ML
SOLUTION INTRAVENOUS AS NEEDED
Status: DISCONTINUED | OUTPATIENT
Start: 2024-08-03 | End: 2024-08-03

## 2024-08-03 RX ORDER — IBUPROFEN 600 MG/1
600 TABLET ORAL EVERY 6 HOURS
Status: DISCONTINUED | OUTPATIENT
Start: 2024-08-03 | End: 2024-08-05 | Stop reason: HOSPADM

## 2024-08-03 RX ORDER — ONDANSETRON 4 MG/1
4 TABLET, FILM COATED ORAL EVERY 6 HOURS PRN
Status: DISCONTINUED | OUTPATIENT
Start: 2024-08-03 | End: 2024-08-05 | Stop reason: HOSPADM

## 2024-08-03 RX ORDER — SIMETHICONE 80 MG
80 TABLET,CHEWABLE ORAL 4 TIMES DAILY PRN
Status: DISCONTINUED | OUTPATIENT
Start: 2024-08-03 | End: 2024-08-05 | Stop reason: HOSPADM

## 2024-08-03 RX ORDER — DIPHENHYDRAMINE HYDROCHLORIDE 50 MG/ML
25 INJECTION INTRAMUSCULAR; INTRAVENOUS ONCE
Status: COMPLETED | OUTPATIENT
Start: 2024-08-03 | End: 2024-08-03

## 2024-08-03 RX ORDER — HYDROMORPHONE HYDROCHLORIDE 1 MG/ML
0.2 INJECTION, SOLUTION INTRAMUSCULAR; INTRAVENOUS; SUBCUTANEOUS EVERY 5 MIN PRN
OUTPATIENT
Start: 2024-08-03

## 2024-08-03 RX ORDER — MIDAZOLAM HYDROCHLORIDE 1 MG/ML
INJECTION INTRAMUSCULAR; INTRAVENOUS AS NEEDED
Status: DISCONTINUED | OUTPATIENT
Start: 2024-08-03 | End: 2024-08-03

## 2024-08-03 RX ORDER — ADHESIVE BANDAGE
10 BANDAGE TOPICAL
Status: DISCONTINUED | OUTPATIENT
Start: 2024-08-03 | End: 2024-08-05 | Stop reason: HOSPADM

## 2024-08-03 RX ORDER — CARBOPROST TROMETHAMINE 250 UG/ML
250 INJECTION, SOLUTION INTRAMUSCULAR ONCE AS NEEDED
Status: DISCONTINUED | OUTPATIENT
Start: 2024-08-03 | End: 2024-08-05 | Stop reason: HOSPADM

## 2024-08-03 RX ORDER — POLYETHYLENE GLYCOL 3350 17 G/17G
17 POWDER, FOR SOLUTION ORAL 2 TIMES DAILY PRN
Status: DISCONTINUED | OUTPATIENT
Start: 2024-08-03 | End: 2024-08-05 | Stop reason: HOSPADM

## 2024-08-03 RX ORDER — OXYTOCIN/0.9 % SODIUM CHLORIDE 30/500 ML
2-30 PLASTIC BAG, INJECTION (ML) INTRAVENOUS CONTINUOUS
Status: DISCONTINUED | OUTPATIENT
Start: 2024-08-03 | End: 2024-08-03

## 2024-08-03 RX ORDER — HYDROMORPHONE HYDROCHLORIDE 1 MG/ML
INJECTION, SOLUTION INTRAMUSCULAR; INTRAVENOUS; SUBCUTANEOUS
Status: COMPLETED
Start: 2024-08-03 | End: 2024-08-03

## 2024-08-03 SDOH — HEALTH STABILITY: MENTAL HEALTH: CURRENT SMOKER: 0

## 2024-08-03 ASSESSMENT — PAIN SCALES - GENERAL
PAINLEVEL_OUTOF10: 0 - NO PAIN
PAINLEVEL_OUTOF10: 5 - MODERATE PAIN
PAINLEVEL_OUTOF10: 0 - NO PAIN
PAINLEVEL_OUTOF10: 5 - MODERATE PAIN
PAINLEVEL_OUTOF10: 0 - NO PAIN
PAINLEVEL_OUTOF10: 5 - MODERATE PAIN
PAINLEVEL_OUTOF10: 0 - NO PAIN
PAINLEVEL_OUTOF10: 3
PAINLEVEL_OUTOF10: 2
PAINLEVEL_OUTOF10: 0 - NO PAIN
PAINLEVEL_OUTOF10: 5 - MODERATE PAIN
PAINLEVEL_OUTOF10: 0 - NO PAIN
PAINLEVEL_OUTOF10: 0 - NO PAIN
PAINLEVEL_OUTOF10: 7
PAINLEVEL_OUTOF10: 3
PAINLEVEL_OUTOF10: 0 - NO PAIN
PAINLEVEL_OUTOF10: 2
PAINLEVEL_OUTOF10: 0 - NO PAIN
PAINLEVEL_OUTOF10: 0 - NO PAIN
PAINLEVEL_OUTOF10: 5 - MODERATE PAIN

## 2024-08-03 ASSESSMENT — PAIN DESCRIPTION - DESCRIPTORS
DESCRIPTORS: ACHING

## 2024-08-03 NOTE — CARE PLAN
The patient's goals for the shift include  Have a baby! BP control     The clinical goals for the shift include BP <160/110, VSS, mom and baby tolerate IOL, safety of both maintained throughout labor and delivery process

## 2024-08-03 NOTE — PROGRESS NOTES
Intrapartum Progress Note    Assessment/Plan   Paige Vieyra is a 38 y.o.  at 38w1d. KIRSTEN: 2024, by Ultrasound.     Principal Problem:    Labor and delivery, indication for care ([facility])    IOL  Continue pitocin  CEFM, currently Cat I  Labor Course  2030 2/30/-3, for CRB/Cyto  2100 CRB/Cyto #1  0100 3 AROM attempt  0300 2, AROM for clear, for pit  0350 pit on  0845 5/70/-3    SPEC  - Diagnosed by SRBPs requiring IV tx  - HELLP labs neg x2  - Nifed 30mg  - Continue Mag, Asx     Pregnancy notables:   Hx laminectomy 10 years ago, for anesthesia consult while admitted    Seen with Dr. Alessio Duncan MD, PGY-1      Subjective   She is doing well with epidural and pitocin infusing. Feeling intermittent contractions.    Objective   Last Vitals:  Temp Pulse Resp BP MAP Pulse Ox   36.4 °C (97.5 °F) 87 16 130/85 101 99 %     Vitals Min/Max Last 24 Hours:  Temp  Min: 36 °C (96.8 °F)  Max: 36.7 °C (98.1 °F)  Pulse  Min: 62  Max: 100  Resp  Min: 16  Max: 24  BP  Min: 76/40  Max: 177/101  MAP (mmHg)  Min: 53  Max: 130    Intake/Output:    Intake/Output Summary (Last 24 hours) at 8/3/2024 1358  Last data filed at 8/3/2024 1330  Gross per 24 hour   Intake 2895 ml   Output 4245 ml   Net -1350 ml       Physical Examination:  General: no acute distress  HEENT: normocephalic, atraumatic  Heart: warm and well perfused  Lungs: breathing comfortably on room air  Abdomen: gravid  Extremities: moving all extremities  Neuro: awake and conversant  Psych: appropriate mood and affect  SVE: 5/70/-3  FHT: 145/mod/+accel/-decel  [place]: q3-5 mins    Lab Review:  Labs in chart were reviewed.    OB Attending Addendum:     Late Entry Note    I saw and evaluated the patient. I personally obtained the key and critical portions of the history and physical exam or was physically present for key and critical portions performed by the resident/fellow. I reviewed the resident/fellow's documentation and discussed the  patient with the resident/fellow. I agree with the resident/fellow's medical decision making as documented in the note.    Patient is a 38 year old  at 38w1d admitted for IOL for preeclampsia with severe features.    - BP currently controlled on nifedipine 30mg, continue to monitor  - No si/sx worsening disease  - EMF reassuring, cat 1  - Continue induction of labor    Deyanira Mccallum MD

## 2024-08-03 NOTE — SIGNIFICANT EVENT
Labor Check    Subjective:   Seen at bedside in NAD. Epidural infusing. Amenable for AROM    Objective:  Cervical Exam  Dilation: 5  Effacement (%): 50  Fetal Station: -2  Method: Manual  OB Examiner: Kraig  Fetal Assessment  Movement: Present  Mode: External US  Baseline Fetal Heart Rate (bpm): 135 bpm  Baseline Classification: Normal  Variability: Moderate (Between 6 and 25 BPM)  Pattern: Accelerations  Pattern Observations: some difficulty maintining continuous FHR tracing due to maternal positioning for epidural placement, RN has remained at bedside  FHR Category: Category I  Multiple Births: No      Contraction Frequency: 5-16    A/P:  - Fetal head palpated without other presenting parts. AROM'd for clear fluid.  - Initite pitocin per protocol  - CEFM, currently Category 1    D/w with. Dr. Kidd.    Luanne Cornell MD  PGY-2, Labor and Delivery

## 2024-08-03 NOTE — DISCHARGE INSTRUCTIONS

## 2024-08-03 NOTE — ANESTHESIA PREPROCEDURE EVALUATION
Patient: Paige Vieyra    Evaluation Method: In-person visit    Procedure Information    Date: 08/02/24  Procedure: Labor Consult         Relevant Problems   GYN   (+) Advanced maternal age in multigravida ([facility])   (+) Supervision of other normal pregnancy, antepartum ([facility])       Clinical information reviewed:   Tobacco  Allergies  Meds   Med Hx  Surg Hx   Fam Hx  Soc Hx        NPO Detail:  No data recorded     OB/Gyn Evaluation    Present Pregnancy    Patient is pregnant now.   Obstetric History                Physical Exam    Airway  Mallampati: II  TM distance: >3 FB  Neck ROM: full     Cardiovascular - normal exam     Dental    Pulmonary - normal exam     Abdominal            Anesthesia Plan    History of general anesthesia?: yes  History of complications of general anesthesia?: no    ASA 2     epidural   (I informed and discussed the risks and benefits of general, spinal and epidural anesthesia with the patient.  The patient expressed her understanding and her questions were answered.  A verbal consent was given by the patient.  Patient has no history of problems with anesthesia  Patient has no family history of problems with anesthesia)  The patient is not a current smoker.    Anesthetic plan and risks discussed with patient.  Use of blood products discussed with patient who consented to blood products.

## 2024-08-03 NOTE — SIGNIFICANT EVENT
Labor Check    Subjective:   Seen at bedside in NAD. CRB out. Amenable for SVE and AROM    Objective:  Cervical Exam  Dilation: 4  Effacement (%): 50  Fetal Station: -3  Method: Manual  OB Examiner: Kraig BARR  Fetal Assessment  Movement: Present  Mode: External US  Baseline Fetal Heart Rate (bpm): 140 bpm  Baseline Classification: Normal  Variability: Moderate (Between 6 and 25 BPM)  Pattern: Accelerations  Pattern Observations: patient sitting up in bed, RN at bedside repositioning monitor  FHR Category: Category I  Multiple Births: No      Contraction Frequency: 1-9    A/P:  - AROM unsuccessful due to patient intolerance and positioning. To obtain epidural and will revisit again.  - Of note, patient previously wishing to avoid pitocin due to strong contraction pain associated. Discussed typical protocol for induction and pitocin protocol here. Is amenable if does not progress on own. Plan to rediscuss.  - CEFM, currently Category 1    D/w with. Dr. Estrella.    Luanne Cornell MD  PGY-2, Labor and Delivery

## 2024-08-03 NOTE — ANESTHESIA PROCEDURE NOTES
Epidural Block    Patient location during procedure: OB  Start time: 8/3/2024 1:22 AM  End time: 8/3/2024 2:01 AM  Reason for block: labor analgesia  Staffing  Performed: resident and attending   Authorized by: Liang Triplett MD    Performed by: Adalberto Del Angel DO    Preanesthetic Checklist  Completed: patient identified, IV checked, risks and benefits discussed, surgical consent, pre-op evaluation, timeout performed and sterile techniques followed  Block Timeout  RN/Licensed healthcare professional reads aloud to the Anesthesia provider and entire team: Patient identity, procedure with side and site, patient position, and as applicable the availability of implants/special equipment/special requirements.  Patient on coagulant treatment: no  Timeout performed at: 8/3/2024 1:25 AM  Block Placement  Patient position: sitting  Prep: ChloraPrep  Sterility prep: cap, drape, gloves and mask  Sedation level: no sedation  Patient monitoring: blood pressure, continuous pulse oximetry and heart rate  Approach: right paramedian  Local numbing: lidocaine 1% to skin and subcutaneous tissues  Vertebral space: thoracic  Thoracic location: T9-T10  Epidural  Loss of resistance technique: air  Guidance: landmark technique        Needle  Needle type: Tuohy   Needle gauge: 17  Needle length: 8.9cm  Needle insertion depth: 8 cm  Catheter size: 19 G  Catheter at skin depth: 13 cm  Catheter securement method: clear occlusive dressing and liquid medical adhesive    Test dose: lidocaine 1.5% with epinephrine 1-to-200,000  Test dose: lidocaine 1.5% with epinephrine 1-to-200,000  Test dose result: no positive test dose    PCEA  Medication concentration used: 0.044% Bupivacaine with 1.25 mcg/mL Fentanyl and [id number] Epinephrine  Dose (mL): 10  Lockout (minutes): 15  1-Hour Limit (boluses/hr): 4  Basal Rate: 14        Assessment  Sensory level: T10 on right, T12 on left, patient expected this given her spine surgery.  Block outcome:  patient comfortable  Number of attempts: 3 or more  Events: no positive test dose  Procedure assessment: patient tolerated procedure well with no immediate complications  Additional Notes  Difficult epidural placement. Multiple attempts with resident, attending and eventually successful with second attending with Paramedian approach at T10 with air.

## 2024-08-03 NOTE — L&D DELIVERY NOTE
OB Delivery Note  8/3/2024  Paige Vieyra  38 y.o.   Vaginal, Spontaneous     Gestational Age: 38w1d  /Para:   Quantitative Blood Loss: Admission to Discharge: 280 mL (2024  2:29 PM - 8/3/2024  6:26 PM)    Venancio Vieyra [[id number]]      Labor Events    Rupture date/time: 8/3/2024 0307  Rupture type: Artificial  Fluid color: Clear  Fluid odor: None  Labor type: Induced Onset of Labor  Labor allowed to proceed with plans for an attempted vaginal birth?: Yes  Induction: Misoprostol, Oxytocin, AROM  First cervical ripening date/time: 2024  Induction date/time: 2024  Induction indications: Hypertensive Disorder of Pregnancy  Complications: None       Labor Event Times    Labor onset date/time: 2024  Dilation complete date/time: 8/3/2024 150  Start pushing date/time: 8/3/2024 1508       Placenta    Placenta delivery date/time: 8/3/2024 1535  Placenta removal: Spontaneous  Placenta appearance: Intact  Placenta disposition: family       Cord    Vessels: 3 vessels  Complications: Nuchal  Nuchal intervention: reduced  Nuchal cord description: loose nuchal cord  Number of loops: 1  Delayed cord clamping?: Yes  Cord clamped date/time: 8/3/2024 16:18:00  Gases sent?: No  Cord comments: No cord blood collected  Stem cell collection (by provider): No       Lacerations    Episiotomy: None  Perineal laceration: None  Perineal laceration repaired?: No  Other lacerations?: No  Repair suture: None       Anesthesia    Method: Epidural       Operative Delivery    Forceps attempted?: No  Vacuum extractor attempted?: No       Shoulder Dystocia    Shoulder dystocia present?: No       [place] Delivery    Time head delivered: 8/3/2024 15:26:20  Birth date/time: 8/3/2024 15:26:58  Delivery type: Vaginal, Spontaneous  Complications: None       Resuscitation    Method: Suctioning, Tactile stimulation       Apgars    Living status: Living  Apgar Component Scores:  1 min.:  5 min.:  10 min.:   15 min.:  20 min.:    Skin color:  1  1       Heart rate:  2  2       Reflex irritability:  2  2       Muscle tone:  1  1       Respiratory effort:  2  2       Total:  8  8       Apgars assigned by: JEFFREY MORALES RN       Delivery Providers    Delivering clinician: Deyanira Mccallum MD   Provider Role    Yadi Hylton, RN Delivery Nurse    Cary Morales, RN Nursery Nurse    Eusebio Chris MD Resident    Rashida Duncan MD Resident                 Intrauterine Device Inserted: Yes, after vaginal delivery   Intrauterine Device Inserted: IUD Device Type: Liletta  Ultrasound Guidance: Yes    IUD after Vaginal Delivery Consent and Procedure Statement:     Consent: The risks, benefits, and alternatives to immediate postplacental intrauterine device insertion were discussed with the patient.  Specifically, we discussed the possible risks of bleeding, infection, perforation, failure, increased risk of ectopic should pregnancy occur, and the higher risk of expulsion.  Written consent was obtained prior to the procedure and is detailed in the patient’s record.      Procedure: The intrauterine device was placed at the fundus using standard technique.    Rashida Duncan MD, PGY-1    OB Attending Addendum:     I saw and evaluated the patient. I personally obtained the key and critical portions of the history and physical exam or was physically present for key and critical portions performed by the resident/fellow. I reviewed the resident/fellow's documentation and discussed the patient with the resident/fellow. I agree with the resident/fellow's medical decision making as documented in the note.    S/p uncomplicated   ppIUD placed under ultrasound guidance    Patient voiced desire for [place] birth after delivery, had not previously been discussed. Discussed strong recommendations against this practice, risk of  infection and sepsis, risk of trauma with /placental handling, jaundice and hepatitis.  "Patient voiced understanding of these risks, and wishes to leave  and placenta attached for \"a few hours\". No absolute contraindications (no IAI, retained placenta portion, s/p vaginal delivery, no active  resuscitation needed, and no hemorrhage complications.) Will continue to encourage cord clamping during ongoing evaluations.     Patient and  in LDR for otherwise routine postpartum care    Deyanira Mccallum MD  "

## 2024-08-03 NOTE — HOSPITAL COURSE
B+ Ab-   Delivery history:  Code Pink Level *** for ***  Apgars   at 1min,  at 5min  Resuscitation:    Rupture of Membranes Duration: This patient's mother is not on file.  Fluid: ***    Pregnancy history:   Abnormal Labs: PNS normal    Ultrasounds: anatomy U/S x2 normal    Pregnancy complications/maternal PMH:  Hx of 2ed HSV (herpes labialis only)   Maternal meds: ASA, vitamin C, PNVs, Flexeril

## 2024-08-03 NOTE — SIGNIFICANT EVENT
Labor Check    Subjective:   At bedside for CRB placement    Objective:  SVE: 2/50/-3  Patient was placed in dorsal lithotomy position, a speculum was placed, and a cervical ripening balloon was guided through the external and internal cervical os with a ring forceps. The balloon was inflated with 60cc of normal saline. Pt tolerated the procedure well.     A/P:  - CRB placed without complication. Cyto #1 placed.  - CEFM, currently Category 1    D/w with. Dr. Kidd.    Luanne Cornell MD  PGY-2, Labor and Delivery

## 2024-08-04 VITALS
RESPIRATION RATE: 16 BRPM | SYSTOLIC BLOOD PRESSURE: 120 MMHG | TEMPERATURE: 97.9 F | DIASTOLIC BLOOD PRESSURE: 75 MMHG | WEIGHT: 224.87 LBS | HEIGHT: 66 IN | BODY MASS INDEX: 36.14 KG/M2 | HEART RATE: 75 BPM | OXYGEN SATURATION: 98 %

## 2024-08-04 LAB
ALBUMIN SERPL BCP-MCNC: 2.5 G/DL (ref 3.4–5)
ALBUMIN SERPL BCP-MCNC: 2.7 G/DL (ref 3.4–5)
ALP SERPL-CCNC: 135 U/L (ref 33–110)
ALP SERPL-CCNC: 167 U/L (ref 33–110)
ALT SERPL W P-5'-P-CCNC: 12 U/L (ref 7–45)
ALT SERPL W P-5'-P-CCNC: 15 U/L (ref 7–45)
ANION GAP SERPL CALC-SCNC: 10 MMOL/L (ref 10–20)
ANION GAP SERPL CALC-SCNC: 14 MMOL/L (ref 10–20)
APTT PPP: 29 SECONDS (ref 27–38)
APTT PPP: 30 SECONDS (ref 27–38)
AST SERPL W P-5'-P-CCNC: 18 U/L (ref 9–39)
AST SERPL W P-5'-P-CCNC: 20 U/L (ref 9–39)
BILIRUB SERPL-MCNC: 0.2 MG/DL (ref 0–1.2)
BILIRUB SERPL-MCNC: 0.4 MG/DL (ref 0–1.2)
BLOOD EXPIRATION DATE: NORMAL
BLOOD EXPIRATION DATE: NORMAL
BUN SERPL-MCNC: 5 MG/DL (ref 6–23)
BUN SERPL-MCNC: 6 MG/DL (ref 6–23)
CALCIUM SERPL-MCNC: 6.6 MG/DL (ref 8.6–10.6)
CALCIUM SERPL-MCNC: 6.7 MG/DL (ref 8.6–10.6)
CHLORIDE SERPL-SCNC: 103 MMOL/L (ref 98–107)
CHLORIDE SERPL-SCNC: 105 MMOL/L (ref 98–107)
CO2 SERPL-SCNC: 20 MMOL/L (ref 21–32)
CO2 SERPL-SCNC: 28 MMOL/L (ref 21–32)
CREAT SERPL-MCNC: 0.75 MG/DL (ref 0.5–1.05)
CREAT SERPL-MCNC: 0.83 MG/DL (ref 0.5–1.05)
DISPENSE STATUS: NORMAL
DISPENSE STATUS: NORMAL
EGFRCR SERPLBLD CKD-EPI 2021: >90 ML/MIN/1.73M*2
EGFRCR SERPLBLD CKD-EPI 2021: >90 ML/MIN/1.73M*2
ERYTHROCYTE [DISTWIDTH] IN BLOOD BY AUTOMATED COUNT: 13.7 % (ref 11.5–14.5)
ERYTHROCYTE [DISTWIDTH] IN BLOOD BY AUTOMATED COUNT: 13.8 % (ref 11.5–14.5)
FIBRINOGEN PPP-MCNC: 249 MG/DL (ref 200–400)
FIBRINOGEN PPP-MCNC: 254 MG/DL (ref 200–400)
GLUCOSE SERPL-MCNC: 126 MG/DL (ref 74–99)
GLUCOSE SERPL-MCNC: 89 MG/DL (ref 74–99)
HCT VFR BLD AUTO: 23.9 % (ref 36–46)
HCT VFR BLD AUTO: 24.9 % (ref 36–46)
HGB BLD-MCNC: 7.9 G/DL (ref 12–16)
HGB BLD-MCNC: 8 G/DL (ref 12–16)
INR PPP: 0.9 (ref 0.9–1.1)
INR PPP: 0.9 (ref 0.9–1.1)
MCH RBC QN AUTO: 28.1 PG (ref 26–34)
MCH RBC QN AUTO: 28.6 PG (ref 26–34)
MCHC RBC AUTO-ENTMCNC: 32.1 G/DL (ref 32–36)
MCHC RBC AUTO-ENTMCNC: 33.1 G/DL (ref 32–36)
MCV RBC AUTO: 87 FL (ref 80–100)
MCV RBC AUTO: 87 FL (ref 80–100)
NRBC BLD-RTO: 0 /100 WBCS (ref 0–0)
NRBC BLD-RTO: 0 /100 WBCS (ref 0–0)
PLATELET # BLD AUTO: 180 X10*3/UL (ref 150–450)
PLATELET # BLD AUTO: 197 X10*3/UL (ref 150–450)
POTASSIUM SERPL-SCNC: 3.8 MMOL/L (ref 3.5–5.3)
POTASSIUM SERPL-SCNC: 3.8 MMOL/L (ref 3.5–5.3)
PRODUCT BLOOD TYPE: 5100
PRODUCT BLOOD TYPE: 5100
PRODUCT CODE: NORMAL
PRODUCT CODE: NORMAL
PROT SERPL-MCNC: 4.7 G/DL (ref 6.4–8.2)
PROT SERPL-MCNC: 5 G/DL (ref 6.4–8.2)
PROTHROMBIN TIME: 10 SECONDS (ref 9.8–12.8)
PROTHROMBIN TIME: 10.1 SECONDS (ref 9.8–12.8)
RBC # BLD AUTO: 2.76 X10*6/UL (ref 4–5.2)
RBC # BLD AUTO: 2.85 X10*6/UL (ref 4–5.2)
SODIUM SERPL-SCNC: 135 MMOL/L (ref 136–145)
SODIUM SERPL-SCNC: 137 MMOL/L (ref 136–145)
UNIT ABO: NORMAL
UNIT ABO: NORMAL
UNIT NUMBER: NORMAL
UNIT NUMBER: NORMAL
UNIT RH: NORMAL
UNIT RH: NORMAL
UNIT VOLUME: 283
UNIT VOLUME: 296
WBC # BLD AUTO: 13.1 X10*3/UL (ref 4.4–11.3)
WBC # BLD AUTO: 13.1 X10*3/UL (ref 4.4–11.3)
XM INTEP: NORMAL
XM INTEP: NORMAL

## 2024-08-04 PROCEDURE — 2500000004 HC RX 250 GENERAL PHARMACY W/ HCPCS (ALT 636 FOR OP/ED): Performed by: STUDENT IN AN ORGANIZED HEALTH CARE EDUCATION/TRAINING PROGRAM

## 2024-08-04 PROCEDURE — 36415 COLL VENOUS BLD VENIPUNCTURE: CPT | Performed by: STUDENT IN AN ORGANIZED HEALTH CARE EDUCATION/TRAINING PROGRAM

## 2024-08-04 PROCEDURE — 2500000001 HC RX 250 WO HCPCS SELF ADMINISTERED DRUGS (ALT 637 FOR MEDICARE OP): Performed by: STUDENT IN AN ORGANIZED HEALTH CARE EDUCATION/TRAINING PROGRAM

## 2024-08-04 PROCEDURE — 88305 TISSUE EXAM BY PATHOLOGIST: CPT | Mod: TC,SUR | Performed by: STUDENT IN AN ORGANIZED HEALTH CARE EDUCATION/TRAINING PROGRAM

## 2024-08-04 PROCEDURE — 2500000005 HC RX 250 GENERAL PHARMACY W/O HCPCS: Performed by: STUDENT IN AN ORGANIZED HEALTH CARE EDUCATION/TRAINING PROGRAM

## 2024-08-04 PROCEDURE — 85610 PROTHROMBIN TIME: CPT | Performed by: STUDENT IN AN ORGANIZED HEALTH CARE EDUCATION/TRAINING PROGRAM

## 2024-08-04 PROCEDURE — 1120000001 HC OB PRIVATE ROOM DAILY

## 2024-08-04 PROCEDURE — 85384 FIBRINOGEN ACTIVITY: CPT | Performed by: STUDENT IN AN ORGANIZED HEALTH CARE EDUCATION/TRAINING PROGRAM

## 2024-08-04 PROCEDURE — 99199 UNLISTED SPECIAL SVC PX/RPRT: CPT

## 2024-08-04 PROCEDURE — 1100000001 HC PRIVATE ROOM DAILY

## 2024-08-04 PROCEDURE — 85027 COMPLETE CBC AUTOMATED: CPT | Performed by: STUDENT IN AN ORGANIZED HEALTH CARE EDUCATION/TRAINING PROGRAM

## 2024-08-04 PROCEDURE — 80053 COMPREHEN METABOLIC PANEL: CPT | Performed by: STUDENT IN AN ORGANIZED HEALTH CARE EDUCATION/TRAINING PROGRAM

## 2024-08-04 RX ORDER — DIPHENHYDRAMINE HCL 25 MG
25 CAPSULE ORAL ONCE
Status: COMPLETED | OUTPATIENT
Start: 2024-08-04 | End: 2024-08-04

## 2024-08-04 RX ORDER — DIPHENHYDRAMINE HYDROCHLORIDE 50 MG/ML
50 INJECTION INTRAMUSCULAR; INTRAVENOUS EVERY 5 MIN PRN
Status: DISCONTINUED | OUTPATIENT
Start: 2024-08-04 | End: 2024-08-05 | Stop reason: HOSPADM

## 2024-08-04 RX ORDER — METOCLOPRAMIDE 10 MG/1
10 TABLET ORAL ONCE
Status: COMPLETED | OUTPATIENT
Start: 2024-08-04 | End: 2024-08-04

## 2024-08-04 RX ORDER — CYCLOBENZAPRINE HCL 10 MG
5 TABLET ORAL ONCE
Status: COMPLETED | OUTPATIENT
Start: 2024-08-04 | End: 2024-08-04

## 2024-08-04 ASSESSMENT — PAIN DESCRIPTION - DESCRIPTORS
DESCRIPTORS: HEADACHE
DESCRIPTORS: CRAMPING;SORE
DESCRIPTORS: HEADACHE
DESCRIPTORS: HEADACHE
DESCRIPTORS: CRAMPING;SORE
DESCRIPTORS: CRAMPING
DESCRIPTORS: ACHING

## 2024-08-04 ASSESSMENT — PAIN SCALES - GENERAL
PAINLEVEL_OUTOF10: 0 - NO PAIN
PAINLEVEL_OUTOF10: 6
PAINLEVEL_OUTOF10: 2
PAINLEVEL_OUTOF10: 6
PAINLEVEL_OUTOF10: 7
PAINLEVEL_OUTOF10: 5 - MODERATE PAIN
PAINLEVEL_OUTOF10: 5 - MODERATE PAIN
PAINLEVEL_OUTOF10: 0 - NO PAIN
PAINLEVEL_OUTOF10: 9
PAINLEVEL_OUTOF10: 4
PAINLEVEL_OUTOF10: 10 - WORST POSSIBLE PAIN
PAINLEVEL_OUTOF10: 2
PAINLEVEL_OUTOF10: 0 - NO PAIN
PAINLEVEL_OUTOF10: 6
PAINLEVEL_OUTOF10: 5 - MODERATE PAIN
PAINLEVEL_OUTOF10: 8

## 2024-08-04 ASSESSMENT — PAIN DESCRIPTION - LOCATION: LOCATION: ABDOMEN

## 2024-08-04 NOTE — ANESTHESIA PREPROCEDURE EVALUATION
Patient: Paige Vieyra    Evaluation Method: In-person visit    Procedure Information    Date: 08/02/24  Procedure: Labor Consult         Relevant Problems   Anesthesia   (+) Other complications of spinal and epidural anesthesia during labor and delivery ([facility])      Pulmonary (within normal limits)      Neuro (within normal limits)      GI   (+) Gastroesophageal reflux disease      /Renal (within normal limits)      Liver (within normal limits)      Endocrine (within normal limits)      Hematology (within normal limits)      Musculoskeletal (within normal limits)      GYN   (+) Advanced maternal age in multigravida ([facility])   (+) Supervision of other normal pregnancy, antepartum ([facility])       Clinical information reviewed:   Tobacco  Allergies  Meds   Med Hx  Surg Hx   Fam Hx  Soc Hx        NPO Detail:  No data recorded     OB/GYN       Physical Exam    Airway  Mallampati: II  TM distance: >3 FB  Neck ROM: full     Cardiovascular - normal exam     Dental    Pulmonary - normal exam     Abdominal          Anesthesia Plan    History of general anesthesia?: yes  History of complications of general anesthesia?: no    ASA 2 - emergent     spinal   (Pt to OR emergently for PPH now s/p 500ml blood loss in last 15 minutes.  Plan for spinal with GA back up.  T&C x 2 units.)  The patient is not a current smoker.    Anesthetic plan and risks discussed with patient (Patient seen and evaluated.  Risks and benefits of spinal with ga back up d/w patient.  Questions invited and answered.  Patient wishes to proceed.  LBL).  Use of blood products discussed with patient who consented to blood products.

## 2024-08-04 NOTE — ANESTHESIA POSTPROCEDURE EVALUATION
Patient: Paige Vieyra    Procedure Summary       Date: 08/03/24 Room / Location:     Anesthesia Start: 2113 Anesthesia Stop:     Procedure: Procedure Not Yet Scheduled Diagnosis:     Scheduled Providers:  Responsible Provider: Dot Thorne MD    Anesthesia Type: spinal ASA Status: 2 - Emergent            Anesthesia Type: spinal    Vitals Value Taken Time   BP 97/65 08/03/24 2231   Temp 35.8 08/03/24 2231   Pulse 70 08/03/24 2231   Resp 16 08/03/24 2231   SpO2 100 08/03/24 2231       Anesthesia Post Evaluation    Patient location during evaluation: floor  Patient participation: complete - patient participated  Level of consciousness: sleepy but conscious  Pain management: adequate  Airway patency: patent  Cardiovascular status: hypotensive  Respiratory status: acceptable and nasal cannula  Hydration status: acceptable  Postoperative Nausea and Vomiting: none        No notable events documented.

## 2024-08-04 NOTE — SIGNIFICANT EVENT
37yo  now PPD0 s/p  at 38.1wga in the setting of pre-eclampsia with severe features.    Called to bedside for additional bleeding and lightheadedness.     Bimanual examination with cervix dilated and copious amount of clot in uterus with small trickle of bright red bleeding. BSUS with IUD in place at fundus and clots present in lower uterine segment. 800mL clot expressed on bimanual examination. Dilaudid 0.4mg given for examination. EBL from delivery 250mL, total EBL at this time 1050mL. Will update QBL once clots weighed.    Postpartum hemorrhage  Starting hgb 12.3 --> Total QBL 1050mL   CBC, coags, fibrinogen, BMP, magnesium ordered  Cross for 2u pRBC, 2 FFP, 2 platelets  Pause magnesium  Will give oxytocin bolus   Recommend OR for exam under anesthesia, likely suction D&C. Reviewed risk of bleeding, transfusion, damage to nearby structures, infection, and need for life-saving hysterectomy. Patient expressed understanding  Will proceed to OR    Madonna Kidd MD  PGY-4, Obstetrics and Gynecology

## 2024-08-04 NOTE — LACTATION NOTE
Lactation Consultant Note  Lactation Consultation  Reason for Consult: Initial assessment  Consultant Name: Debbie Parikh RN IBCLC    Maternal Information  Has mother  before?: Yes  How long did the mother previously breastfeed?: breast fed three older children for 6, 10 and 14 months  Previous Maternal Breastfeeding Challenges: None  Infant to breast within first 2 hours of birth?: Yes    Maternal Assessment  Breast Assessment: Large, Symmetrical, Soft, Compressible  Nipple Assessment: Intact, Erect, Piercing present  [place] Assessment: Normal    Infant Assessment  Infant Behavior: Light sleep  Infant Assessment:  (infant reluctant to suck assertively for a suck assessment- noted some thrusting)    Feeding Assessment  Nutrition Source: Breastmilk  Feeding Method: Nursing at the breast  Feeding Position: Cross - cradle, Skin to skin, Mother demonstrates good positioning  Latch Assessment: No latch achieved, Reluctant    LATCH TOOL  Latch: Too sleepy or reluctant, no latch achieved  Audible Swallowing: None  Type of Nipple: Everted (After stimulation)  Comfort (Breast/Nipple): Soft/non-tender  Hold (Positioning): Minimal assist, teach one side, mother does other, staff holds  LATCH Score: 5    Breast Pump       Other OB Lactation Tools       Patient Follow-up  Inpatient Lactation Follow-up Needed : Yes    Other OB Lactation Documentation  Maternal Risk Factors: Preeclampsia  Infant Risk Factors: Early term birth 37-39 weeks    Recommendations/Summary  Attempted feeding with this visit. Infant was sleepy and disinterested. Mother shared that infant had fed recently and did well. She reported that infant has nursed about four times since delivery. Reviewed with mother typical feeding behaviors and patterns of newborns in the first day and beyond. Instructed on early infant hunger cues and the importance of latching infant both deeply and properly for her comfort and effective milk transfer. Maternal  colostrum is easily expressible. Discussed with mother how to provide good support while nursing towards both her breast and infant. Instructed to keep infant in good alignment and provide stimulation to infant during feedings if she becomes sleepy and sluggish. Placed infant in kangaroo care and instructed her to call for a feeding observation when next nursing infant. Mother has a breast pump for home.

## 2024-08-04 NOTE — PROGRESS NOTES
Postpartum Progress Note    Assessment/Plan   Paige Vieyra is a 38 y.o.  now s/p Vaginal, Spontaneous on 8/3/2024 admitted for postpartum magnesium in the setting of severe pre-eclampsia. Postpartum course notable for PPH requiring D&C.    Postpartum hemorrhage  PPH on PPD0 requiring suction D&C for retained membranes and uterine atony  Total QBL 1350L (250mL from delivery, 1150mL from PPH)  Hgb 12.3 > 8.0, fibrinogen 297>254, platelets 180  AM labs pending. If fibrinogen further downtrending, will transfuse FFP (goal >300)  Crossed for 2u pRBC, 2FFP, 2 platelets  For IV iron during admission, ordered    Severe pre-eclampsia  Diagnosed by severe range BPs requiring IV treatment  Current antihypertensive regimen: nifedipine 30mg daily  HELLP labs negative x2  Asymptomatic  Continue magnesium 2g/hr for seizure prophylaxis, no signs/symptoms of mag toxicity    Postpartum care  Continue routine postpartum care  Breastfeeding, lactation consultation as needed  Contraception plan: ppIUD removed with D&C, plan for interval contraception    To be seen and discussed with Dr. Alexander Kidd MD  PGY-4, Obstetrics and Gynecology      Subjective   Patient feeling improved. Still feeling woozy. Pain well controlled. Denies PEC sxs.    Objective   Last Vitals:  Temp Pulse Resp BP MAP Pulse Ox   37.3 °C (99.1 °F) 74 20 (visulaized) 125/72   98 %     Vitals Min/Max Last 24 Hours:  Temp  Min: 36 °C (96.8 °F)  Max: 37.3 °C (99.1 °F)  Pulse  Min: 57  Max: 109  Resp  Min: 16  Max: 20  BP  Min: 103/57  Max: 142/91    Intake/Output:    Intake/Output Summary (Last 24 hours) at 2024 0446  Last data filed at 2024 0300  Gross per 24 hour   Intake 4559.47 ml   Output 6215 ml   Net -1655.53 ml       Physical Examination:  General: no acute distress  HEENT: normocephalic, atraumatic  Heart: normal rate, regular rhythm  Lungs: breathing comfortably on room air  Abdomen: soft, nondistended, fundus firm below  umbilicus  Extremities: moving all extremities  Neuro: awake and conversant, reflexes per RN  Psych: appropriate mood and affect    Lab Review:  Results from last 7 days   Lab Units 08/04/24  0018 08/03/24  2109 08/02/24  2030 08/02/24  1636   WBC AUTO x10*3/uL 13.1* 16.1* 7.4 7.2   HEMOGLOBIN g/dL 8.0* 10.2* 12.3 12.7   HEMATOCRIT % 24.9* 31.4* 37.8 39.0   PLATELETS AUTO x10*3/uL 180 225 247 212   AST U/L  --   --  25 25   ALT U/L  --   --  19 20   CREATININE mg/dL  --  0.69 0.67 0.75

## 2024-08-04 NOTE — ANESTHESIA PROCEDURE NOTES
Spinal Block    Patient location during procedure: OB  Start time: 8/3/2024 9:15 PM  End time: 8/3/2024 9:33 PM  Reason for block: primary anesthetic  Staffing  Performed: resident   Authorized by: Dot Thorne MD    Performed by: Reese Katz MD    Preanesthetic Checklist  Completed: patient identified, IV checked, risks and benefits discussed, surgical consent, pre-op evaluation, timeout performed and sterile techniques followed  Block Timeout  RN/Licensed healthcare professional reads aloud to the Anesthesia provider and entire team: Patient identity, procedure with side and site, patient position, and as applicable the availability of implants/special equipment/special requirements.  Patient on coagulant treatment: no  Timeout performed at: 8/3/2024 9:16 PM  Spinal Block  Patient position: sitting  Prep: ChloraPrep  Sterility prep: mask, gloves, drape and cap  Sedation level: no sedation  Patient monitoring: heart rate, continuous pulse oximetry and blood pressure  Approach: midline  Vertebral space: L3-4  Injection technique: single-shot  Needle  Needle type: pencil-point   Needle gauge: 24 G  Free flowing CSF: yes    Assessment  Sensory level: T10 bilateral  Block outcome: patient comfortable  Procedure assessment: patient tolerated procedure well with no immediate complications

## 2024-08-04 NOTE — OP NOTE
Date: 2024 - 8/3/2024  OR Location: [facility]    Name: Paige Vieyra, : 1985, Age: 38 y.o., MRN: [id number], Sex: female    Diagnosis  Preoperative Diagnoses:  Postpartum hemorrhage, likely due to atony Postop Diagnoses:  Same  S/p IUD removal     Procedures  Postpartum Dilation and curettage under ultrasound guidance  IUD removal    Surgeons      * Thu Gutiérrez - Primary    Resident/Fellow/Other Assistant:  Surgeons and Role:     * Madonna Kidd MD - Resident - Assisting    Procedure Summary  Anesthesia: Spinal  ASA: II  Anesthesia Staff: Anesthesiologist: Dot Thorne MD  Anesthesia Resident: Reese Katz MD  Estimated Blood Loss: 200mL  Intra-op Medications:   Administrations occurring from  to  on 24:   Medication Name Total Dose   acetaminophen (Tylenol) tablet 975 mg 975 mg   ibuprofen tablet 600 mg 600 mg   lactated Ringer's infusion 2.08 mL   magnesium sulfate 20 gram/500 mL (4 %) infusion 42.23 g   oxytocin (Pitocin) bolus from bag 18,000 sanya-units   oxytocin (Pitocin) infusion in sodium chloride 0.9% 30 units/500 mL Cannot be calculated   fentaNYL (PF)-BUPivacaine - Epi 1.25 mcg/mL- 0.044 % epidural infusion 164.08 mL   metoclopramide (Reglan) injection 10 mg Cannot be calculated   oxytocin (Pitocin) infusion in sodium chloride 0.9% 30 units/500 mL 6,758 sanya-units              Anesthesia Record               Intraprocedure I/O Totals          Intake    LR bolus 1000.00 mL    Total Intake 1000 mL       Output    Urine 75 mL    Total Output 75 mL       Net    Net Volume 925 mL          Specimen: Products of conception    Staff:   Scrub Person: Christina    Indication: 39yo  now PPD0 s/p  at 38.1wga passing clots on postpartum floor and found to have large clot burden in her uterus. Clots were expressed from the uterus (900mL) and she was transferred to labor and delivery for further management.    Findings: Globular uterus with clot present at procedure start.  Large clot evacuated from uterus with small amount of membranes. Small anterior cervical abrasion which was hemostatic by procedure end. Thin endometrial stripe with firm fundus by procedure end.    Procedure Details:  Patient was taken to the OR where spinal anesthesia was administered. Pitocin bolus was running.  A time out was performed, identifying the patient by name, medical record, and date of birth.  She was then placed in the dorsal lithotomy position. Oneill catheter was previously in place. She was then prepped and draped in the usual sterile fashion. Bimanual examination was performed and additional clot was expressed from uterus. [place] speculum was inserted to visualize the cervix. A ring forceps was used to grasp the anterior lip of the cervix. 12mm suction curette was used to remove clot from uterus, and a small amount of membranes was noted to come. Multiple passes of the curette were required and smaller curette (10mm) was used for additional passes. Liletta IUD was removed with these passes. A small trickle was noted and the cervix was walked with ring forceps and a small cervical abrasion was observed at 12 o clock. Pressure was applied and this was then hemostatic. Bimanual massage was performed and uterus was firm below umiblicus. Hemostasis was ensured.  Endometrial strip was thin on ultrasound. Speculum was removed from the vagina. All counts were correct, the patient tolerated the procedure well.  Dr. Gutiérrez was present for the entire procedure.  The patient was taken to PACU in stable condition. Buccal cytotec 800mcg was given in the postoperative area. Repeat labs planned for 0000 on 8/4. No blood products were administered.    Total QBL with delivery (250mL), PPH (900mL) and OR (200mL): 1350mL    Complications:  None; patient tolerated the procedure well.     Disposition: PACU - hemodynamically stable.  Condition: stable

## 2024-08-05 VITALS
TEMPERATURE: 98.6 F | BODY MASS INDEX: 36.14 KG/M2 | WEIGHT: 224.87 LBS | OXYGEN SATURATION: 96 % | DIASTOLIC BLOOD PRESSURE: 82 MMHG | HEIGHT: 66 IN | RESPIRATION RATE: 18 BRPM | SYSTOLIC BLOOD PRESSURE: 131 MMHG | HEART RATE: 80 BPM

## 2024-08-05 PROCEDURE — 2500000001 HC RX 250 WO HCPCS SELF ADMINISTERED DRUGS (ALT 637 FOR MEDICARE OP): Performed by: STUDENT IN AN ORGANIZED HEALTH CARE EDUCATION/TRAINING PROGRAM

## 2024-08-05 RX ORDER — ACETAMINOPHEN 325 MG/1
975 TABLET ORAL EVERY 6 HOURS
Qty: 120 TABLET | Refills: 0 | Status: SHIPPED | OUTPATIENT
Start: 2024-08-05 | End: 2024-08-15

## 2024-08-05 RX ORDER — IBUPROFEN 600 MG/1
600 TABLET ORAL EVERY 6 HOURS
Qty: 40 TABLET | Refills: 0 | Status: SHIPPED | OUTPATIENT
Start: 2024-08-05 | End: 2024-08-15

## 2024-08-05 RX ORDER — FERROUS SULFATE 324(65)MG
65 TABLET, DELAYED RELEASE (ENTERIC COATED) ORAL
Qty: 10 TABLET | Refills: 0 | Status: SHIPPED | OUTPATIENT
Start: 2024-08-05 | End: 2024-08-15

## 2024-08-05 RX ORDER — FERROUS SULFATE 325(65) MG
65 TABLET ORAL
Qty: 30 TABLET | Refills: 1 | Status: SHIPPED | OUTPATIENT
Start: 2024-08-05

## 2024-08-05 RX ORDER — ACETAMINOPHEN 500 MG
1000 TABLET ORAL EVERY 6 HOURS PRN
Qty: 30 TABLET | Refills: 2 | Status: SHIPPED | OUTPATIENT
Start: 2024-08-05 | End: 2024-08-15

## 2024-08-05 ASSESSMENT — PAIN DESCRIPTION - DESCRIPTORS: DESCRIPTORS: CRAMPING

## 2024-08-05 ASSESSMENT — PAIN SCALES - GENERAL
PAINLEVEL_OUTOF10: 4
PAINLEVEL_OUTOF10: 8
PAINLEVEL_OUTOF10: 4
PAINLEVEL_OUTOF10: 3
PAINLEVEL_OUTOF10: 0 - NO PAIN
PAINLEVEL_OUTOF10: 3

## 2024-08-05 ASSESSMENT — PAIN DESCRIPTION - ORIENTATION: ORIENTATION: LOWER

## 2024-08-05 ASSESSMENT — PAIN DESCRIPTION - LOCATION: LOCATION: ABDOMEN

## 2024-08-05 NOTE — LACTATION NOTE
Lactation Consultant Note  Lactation Consultation  Reason for Consult: Follow-up assessment  Consultant Name: Cherry Brambila RN, IBCLC    Maternal Information       Maternal Assessment       Infant Assessment       Feeding Assessment       LATCH TOOL       Breast Pump       Other OB Lactation Tools       Patient Follow-up       Other OB Lactation Documentation       Recommendations/Summary  In to follow up with mom regarding breastfeeding. Mom stated that it is generally going well, but that infant has been sleepy today. Mom stated that she will soon feed infant and prefers not to wake infant right now. She stated she will call for next feeding to assess latch.

## 2024-08-05 NOTE — CARE PLAN
Problem: Pain - Adult  Goal: Verbalizes/displays adequate comfort level or baseline comfort level  Outcome: Met     Problem: Safety - Adult  Goal: Free from fall injury  Outcome: Met     Problem: Postpartum  Goal: Experiences normal postpartum course  Outcome: Met  Goal: Appropriate maternal -  bonding  Outcome: Met  Goal: Establish and maintain infant feeding pattern for adequate nutrition  Outcome: Met  Goal: Incisions, wounds, or drain sites healing without S/S of infection  Outcome: Met  Goal: No s/sx infection  Outcome: Met  Goal: No s/sx of hemorrhage  Outcome: Met  Goal: Minimal s/sx of HDP and BP<160/110  Outcome: Met     Problem: Discharge Planning  Goal: Discharge to home or other facility with appropriate resources  Outcome: Met

## 2024-08-05 NOTE — SIGNIFICANT EVENT
Patient meets criteria for home monitoring of blood pressure post discharge.  Reason: current preeclampsia with severe features,. Met with patient to assess for availability of home BP monitor.   Patient does not have access to BP monitor at home.  Pt agreed to order home BP monitor from [employer].   Large BP monitor delivered to room. Patient educated on how to use BP monitor. Patient educated on importance of continuing to monitor BP at home, recording BP on home monitoring log and s/sx of when to call her provider.  Pt verbalized understanding the above information.

## 2024-08-05 NOTE — DISCHARGE SUMMARY
Discharge Summary    Admission Date: 8/2/2024  Discharge Date: 08/05/24     Discharge Diagnosis  Labor and delivery, indication for care ([facility])    Hospital Course  Delivery Date: 8/3/2024 3:26 PM  Delivery type: Vaginal, Spontaneous   GA at delivery: 38w3d  Outcome: Living  Anesthesia during delivery: Epidural  Intrapartum complications: None  Feeding method: Breastfeeding Status: Yes     Procedures:  D&C for PPH, EBL 1350  Contraception at discharge: ppIUD was placed and subsequently removed, declines PPBC at this time, education provided      Pertinent Physical Exam At Time of Discharge  Physical Exam  Vitals reviewed.   Constitutional:       Appearance: Normal appearance.   HENT:      Head: Normocephalic.   Cardiovascular:      Rate and Rhythm: Normal rate and regular rhythm.      Pulses: Normal pulses.      Heart sounds: Normal heart sounds.   Pulmonary:      Effort: Pulmonary effort is normal.      Breath sounds: Normal breath sounds.   Abdominal:      General: Abdomen is flat. Bowel sounds are normal.      Palpations: Abdomen is soft.      Comments: Fundus firm, midline, below umbilicus, light lochia      Skin:     General: Skin is warm.   Neurological:      Mental Status: She is alert.   Psychiatric:         Mood and Affect: Mood normal.         Behavior: Behavior normal.          Last Vitals:  Temp Pulse Resp BP MAP Pulse Ox   37 °C (98.6 °F) 80 18 131/82 98 96 %     Discharge Meds     Your medication list        START taking these medications        Instructions Last Dose Given Next Dose Due   acetaminophen 325 mg tablet  Commonly known as: Tylenol      Take 3 tablets (975 mg) by mouth every 6 hours for 10 days.       ferrous sulfate 324 mg (65 mg elemental iron) EC tablet (delayed release)      Take 1 tablet (65 mg) by mouth once daily with breakfast.       ibuprofen 600 mg tablet      Take 1 tablet (600 mg) by mouth every 6 hours for 10 days.              CONTINUE taking these medications         Instructions Last Dose Given Next Dose Due   ascorbic acid 250 mg tablet  Commonly known as: Vitamin C           biotin 10 mg tablet           DIETARY SUPPLEMENT ORAL           Fish OiL 1,000 mg (120 mg-180 mg) capsule  Generic drug: omega 3-dha-epa-fish oil           ondansetron 4 mg tablet  Commonly known as: Zofran      Take 1 tablet (4 mg) by mouth every 8 hours if needed for nausea or vomiting.       prenatal19-iron-folic-omega3 29-1-400 mg combo pack,tablet and cap,DR                  STOP taking these medications      aspirin 81 mg EC tablet        BACILLUS COAGULANS-INULIN ORAL        triamcinolone 0.1 % ointment  Commonly known as: Kenalog                  Where to Get Your Medications        These medications were sent to [facility]  [address] [place] [address]      Hours: 8:30 AM to 5 PM Mon-Fri Phone: [phone]   acetaminophen 325 mg tablet  ferrous sulfate 324 mg (65 mg elemental iron) EC tablet (delayed release)  ibuprofen 600 mg tablet          Complications Requiring Follow-Up  sPEC now s/p mag  - Based on BPs requiring immediate antihypertensive treatment  - On nifed 30, held due to hypotension  - BPs normotensive without medication  - HELLP labs WNL  - Asymptomatic  - BP cuff for home  - Reviewed signs of elevated BP, appropriate BP parameters and how to monitor BP at home     Acute Blood Loss Anemia  - EBL 1350; s/p D&C  - Hgb: 12.3--> 10.2--> 8.0--> 7.9  - Asymptomatic  - PO iron on d/c     Patient requesting early discharge.  Discussed medical recommendation to stay for 3 days in setting of PPH and sPEC and the risks/benefits of early DC.  Discussed risks of early d/c with HTN including readmission, seizures, stroke, and death.  I also discussed the risk of continued bleeding, hypotension, dizziness, falls, LOC given her delivery complicated by a PPH requiring D&C. Pt verbalized understandng of risks and requests early d/c.     Test Results Pending At  Discharge  Pending Labs       Order Current Status    Surgical Pathology Exam - PRODUCTS OF CONCEPTION In process            Outpatient Follow-Up  Future Appointments   Date Time Provider Department Center   8/15/2024  9:20 AM AMNA Jose-RAY [id number] Jose De Jesus GUSTAFSON spent 35 minutes in the professional and overall care of this patient.      Rhonda Mcintyre, APRN-CNP

## 2024-08-05 NOTE — ANESTHESIA POSTPROCEDURE EVALUATION
Patient: Paige Vieyra    Procedure Summary       Date: 24 Room / Location:     Anesthesia Start: 0122 Anesthesia Stop: 1526    Procedure: Labor Analgesia Diagnosis:     Scheduled Providers:  Responsible Provider: Dot Thorne MD    Anesthesia Type: epidural ASA Status: 2            Anesthesia Type: epidural    Vitals Value Taken Time     24 0832     24 0832     24 0832     24 0832     24 0832       Anesthesia Post Evaluation    Patient location during evaluation: floor  Patient participation: complete - patient participated  Level of consciousness: awake  Pain management: adequate  Airway patency: patent  Cardiovascular status: acceptable  Respiratory status: acceptable  Hydration status: acceptable  Postoperative Nausea and Vomiting: none  Comments: Paige Vieyra is a 38 y.o., , who had a Vaginal, Spontaneous delivery on 8/3/2024 at 38w3d and is now POD.    She had Neuraxial Anesthesia without immediate complications noted.       Pain well controlled    ---------------------------               2418         ---------------------------   BP:          113/73         Pulse:         75           Resp:          16           Temp:   36.6 °C (97.9 °F)   SpO2:          95%         ---------------------------    Neuraxial site assessed. No visible redness or swelling or drainage. Patient able to ambulate and move all extremities without difficulty. Able to void. No complaints of nausea/vomiting. Tolerating PO intake well. No s/sx of PDPH.     Anesthesia will sign off     MIGUELITO Singh          No notable events documented.

## 2024-08-06 LAB
BLOOD EXPIRATION DATE: NORMAL
BLOOD EXPIRATION DATE: NORMAL
DISPENSE STATUS: NORMAL
DISPENSE STATUS: NORMAL
PRODUCT BLOOD TYPE: 5100
PRODUCT BLOOD TYPE: 5100
PRODUCT CODE: NORMAL
PRODUCT CODE: NORMAL
UNIT ABO: NORMAL
UNIT ABO: NORMAL
UNIT NUMBER: NORMAL
UNIT NUMBER: NORMAL
UNIT RH: NORMAL
UNIT RH: NORMAL
UNIT VOLUME: 283
UNIT VOLUME: 296
XM INTEP: NORMAL
XM INTEP: NORMAL

## 2024-08-07 LAB
LABORATORY COMMENT REPORT: NORMAL
PATH REPORT.FINAL DX SPEC: NORMAL
PATH REPORT.GROSS SPEC: NORMAL
PATH REPORT.RELEVANT HX SPEC: NORMAL
PATH REPORT.TOTAL CANCER: NORMAL

## 2024-08-09 ENCOUNTER — TELEMEDICINE (OUTPATIENT)
Dept: OBSTETRICS AND GYNECOLOGY | Facility: CLINIC | Age: 39
End: 2024-08-09
Payer: COMMERCIAL

## 2024-08-09 ENCOUNTER — APPOINTMENT (OUTPATIENT)
Dept: OBSTETRICS AND GYNECOLOGY | Facility: CLINIC | Age: 39
End: 2024-08-09
Payer: COMMERCIAL

## 2024-08-09 DIAGNOSIS — O14.10 SEVERE PRE-ECLAMPSIA, ANTEPARTUM (HHS-HCC): Primary | ICD-10-CM

## 2024-08-09 PROCEDURE — 99213 OFFICE O/P EST LOW 20 MIN: CPT | Performed by: ADVANCED PRACTICE MIDWIFE

## 2024-08-09 RX ORDER — NIFEDIPINE 30 MG/1
30 TABLET, FILM COATED, EXTENDED RELEASE ORAL
Qty: 30 TABLET | Refills: 0 | Status: SHIPPED | OUTPATIENT
Start: 2024-08-09 | End: 2024-08-14

## 2024-08-09 ASSESSMENT — ENCOUNTER SYMPTOMS
SWEATS: 0
ORTHOPNEA: 0
SHORTNESS OF BREATH: 0
NECK PAIN: 0
PALPITATIONS: 0
PND: 0
BLURRED VISION: 0
HYPERTENSION: 1
HEADACHES: 0

## 2024-08-09 ASSESSMENT — EDINBURGH POSTNATAL DEPRESSION SCALE (EPDS)
I HAVE BEEN ABLE TO LAUGH AND SEE THE FUNNY SIDE OF THINGS: AS MUCH AS I ALWAYS COULD
I HAVE BLAMED MYSELF UNNECESSARILY WHEN THINGS WENT WRONG: NO, NEVER
I HAVE BEEN SO UNHAPPY THAT I HAVE BEEN CRYING: ONLY OCCASIONALLY
I HAVE FELT SCARED OR PANICKY FOR NO GOOD REASON: NO, NOT AT ALL
THE THOUGHT OF HARMING MYSELF HAS OCCURRED TO ME: NEVER
I HAVE LOOKED FORWARD WITH ENJOYMENT TO THINGS: AS MUCH AS I EVER DID
I HAVE BEEN ANXIOUS OR WORRIED FOR NO GOOD REASON: NO, NOT AT ALL
THINGS HAVE BEEN GETTING ON TOP OF ME: NO, MOST OF THE TIME I HAVE COPED QUITE WELL
TOTAL SCORE: 3
I HAVE BEEN SO UNHAPPY THAT I HAVE HAD DIFFICULTY SLEEPING: NOT AT ALL
I HAVE FELT SAD OR MISERABLE: NOT VERY OFTEN

## 2024-08-09 NOTE — PROGRESS NOTES
Virtual or Telephone Consent    An interactive audio and video telecommunication system which permits real time communications between the patient (at the originating site) and provider (at the distant site) was utilized to provide this telehealth service.   Verbal consent was requested and obtained from Paige Vieyra on this date, 24 for a telehealth visit.       Subjective   38 y.o.  presenting for postpartum BP check follow-up.   Patient had RUQ pain yesterday for about 4 hours, but it has resolved. Has taken PO Tylenol and Ibuprofen since discharge. Denies HA and visual disturbance.  Had a gush of blood yesterday while cooking dinner - states bleeding is not heavy, just heavier than it had been before that point.     Delivery Date: 8/3/2024  GA at Delivery: 38w3d   Type of Delivery: Vaginal, Spontaneous   Delivery complicated by PPH and sPEC    Pain: controlled  Lacerations:   Laceration Repaired?   Perineal: None No    Periurethral:       Labial:       Sulcus:       Vaginal:       Cervical:           Repair suture: None   Number of repair packets:     Blood loss (mL):     Total estimated blood loss (mL): 200     Lochia: decreasing  Feeding Method: She is breast feeding exclusively. no breast or nursing problems  Lactation Consult Needed?: No    Bonding with Baby: yes  Mood:     Postpartum Depression: Low Risk  (2024)    [place]  Depression Scale     Last EPDS Total Score: 3     Last EPDS Self Harm Result: Never       Pregnancy Problems (from 23 to present)       Problem Noted Resolved    Severe pre-eclampsia, postpartum ([facility]) 2024 by JOCELYN Patel No    Priority:  Medium      Overview Signed 2024  2:21 PM by JOCELYN Patel     No meds on discharge, nifed 30 held for hypotension/normotensive BPs         Other complications of spinal and epidural anesthesia during labor and delivery ([facility]) 8/3/2024 by Dot Thorne MD No    Priority:  Medium  "     Labor and delivery, indication for care ([facility]) 2024 by Luanne Cornell MD No    Priority:  Medium      History of laminectomy 2024 by ANNA Jose No    Priority:  Medium      Overview Signed 2024 12:26 PM by ANNA Jose     Note from PREVIOUS PREGNANCY -- Response from Dr. Mary -- \"She wouldnt benefit from a anesthesia consult. We cant changeher anatomy. Due to scarring on the surgical site epidural spread could be affected. Given that epidural placement is a blind(non imaging guided) procedure, better results than last timecannot be guaranteed. We might get luckier than last time or her scarring might have becomebetter or worse but those are unchangeable factors. We would always try to place this epidural at the lowest level(in this case L3-4) which usually provides good pain reliefespecially during the first stage of labor.\" Vijaya Richter 12/15/2022 11:08 AM         Supervision of other normal pregnancy, antepartum ([facility]) 2023 by ANNA Jose No    Priority:  Medium      Overview Addendum 2024 12:56 PM by ANNA Jose     Starting BMI 32.  Covid vaccinated x 2, declines booster.  Flu vaccinated.  Hx of HSV-1, only oral outbreaks per patient.  Will begin ASA at 12 weeks.  It's a GIRL!          Advanced maternal age in multigravida ([facility]) 2023 by ANNA Jose No    Priority:  Medium      Overview Signed 2023 10:44 AM by ANNA Jose     Declines aneuploidy screening.               OBJECTIVE: patient well appearing on virtual platform    Assessment and Plan:  Pt is a 38 y.o. , postpartum course complicated by elevated blood pressures after sPEC during hospital course  Adjusting well, discussed PMADs and when to contact office.  Breastfeeding going well.  . Reviewed S&S mastitis, and early remedies including ibuprofen, cold packs, and regular expression.  BP " meds started with consultation by Dr. Grigsby. Blood pressure call-in parameters reviewed.   Patient advised to call in with any signs/symptoms of preeclampsia.  Advised to call office for breast complaints, abnormal bleeding, mood changes, or other concerning symptoms. Warning s/s discussed.     Problem List Items Addressed This Visit    None  Visit Diagnoses         Codes    Severe pre-eclampsia, antepartum ([facility])    -  Primary O14.10    Relevant Medications    NIFEdipine ER (Procardia XL) 30 mg 24 hr tablet    Postpartum care following vaginal delivery ([facility])     Z39.2          F/u with med/BP check next week with physician  ANNA Bhardwaj

## 2024-08-13 ASSESSMENT — ENCOUNTER SYMPTOMS
NECK PAIN: 0
PND: 0
PALPITATIONS: 0
HYPERTENSION: 1
BLURRED VISION: 0
HEADACHES: 1
SWEATS: 0
SHORTNESS OF BREATH: 0
ORTHOPNEA: 0

## 2024-08-14 ENCOUNTER — APPOINTMENT (OUTPATIENT)
Dept: OBSTETRICS AND GYNECOLOGY | Facility: CLINIC | Age: 39
End: 2024-08-14
Payer: COMMERCIAL

## 2024-08-14 ENCOUNTER — LAB (OUTPATIENT)
Dept: LAB | Facility: LAB | Age: 39
End: 2024-08-14
Payer: COMMERCIAL

## 2024-08-14 VITALS
SYSTOLIC BLOOD PRESSURE: 110 MMHG | WEIGHT: 200 LBS | BODY MASS INDEX: 34.15 KG/M2 | OXYGEN SATURATION: 97 % | DIASTOLIC BLOOD PRESSURE: 72 MMHG | HEART RATE: 78 BPM | HEIGHT: 64 IN

## 2024-08-14 DIAGNOSIS — O14.10 SEVERE PRE-ECLAMPSIA, ANTEPARTUM (HHS-HCC): ICD-10-CM

## 2024-08-14 PROBLEM — Z98.890 HISTORY OF LAMINECTOMY: Status: RESOLVED | Noted: 2024-06-12 | Resolved: 2024-08-14

## 2024-08-14 PROBLEM — Z34.80 SUPERVISION OF OTHER NORMAL PREGNANCY, ANTEPARTUM (HHS-HCC): Status: RESOLVED | Noted: 2023-12-20 | Resolved: 2024-08-14

## 2024-08-14 PROBLEM — O09.529 ADVANCED MATERNAL AGE IN MULTIGRAVIDA (HHS-HCC): Status: RESOLVED | Noted: 2023-12-20 | Resolved: 2024-08-14

## 2024-08-14 LAB
ALBUMIN SERPL BCP-MCNC: 3.7 G/DL (ref 3.4–5)
ALP SERPL-CCNC: 107 U/L (ref 33–110)
ALT SERPL W P-5'-P-CCNC: 36 U/L (ref 7–45)
ANION GAP SERPL CALC-SCNC: 13 MMOL/L (ref 10–20)
AST SERPL W P-5'-P-CCNC: 44 U/L (ref 9–39)
BILIRUB SERPL-MCNC: 0.3 MG/DL (ref 0–1.2)
BUN SERPL-MCNC: 14 MG/DL (ref 6–23)
CALCIUM SERPL-MCNC: 9.3 MG/DL (ref 8.6–10.3)
CHLORIDE SERPL-SCNC: 104 MMOL/L (ref 98–107)
CO2 SERPL-SCNC: 26 MMOL/L (ref 21–32)
CREAT SERPL-MCNC: 0.83 MG/DL (ref 0.5–1.05)
EGFRCR SERPLBLD CKD-EPI 2021: >90 ML/MIN/1.73M*2
ERYTHROCYTE [DISTWIDTH] IN BLOOD BY AUTOMATED COUNT: 15 % (ref 11.5–14.5)
GLUCOSE SERPL-MCNC: 79 MG/DL (ref 74–99)
HCT VFR BLD AUTO: 31 % (ref 36–46)
HGB BLD-MCNC: 9.5 G/DL (ref 12–16)
MCH RBC QN AUTO: 28.1 PG (ref 26–34)
MCHC RBC AUTO-ENTMCNC: 30.6 G/DL (ref 32–36)
MCV RBC AUTO: 92 FL (ref 80–100)
NRBC BLD-RTO: 0 /100 WBCS (ref 0–0)
PLATELET # BLD AUTO: 515 X10*3/UL (ref 150–450)
POTASSIUM SERPL-SCNC: 4.1 MMOL/L (ref 3.5–5.3)
PROT SERPL-MCNC: 7.2 G/DL (ref 6.4–8.2)
RBC # BLD AUTO: 3.38 X10*6/UL (ref 4–5.2)
SODIUM SERPL-SCNC: 139 MMOL/L (ref 136–145)
WBC # BLD AUTO: 6.4 X10*3/UL (ref 4.4–11.3)

## 2024-08-14 PROCEDURE — 82746 ASSAY OF FOLIC ACID SERUM: CPT

## 2024-08-14 PROCEDURE — 83550 IRON BINDING TEST: CPT

## 2024-08-14 PROCEDURE — 85027 COMPLETE CBC AUTOMATED: CPT

## 2024-08-14 PROCEDURE — 82728 ASSAY OF FERRITIN: CPT

## 2024-08-14 PROCEDURE — 82607 VITAMIN B-12: CPT

## 2024-08-14 PROCEDURE — 36415 COLL VENOUS BLD VENIPUNCTURE: CPT

## 2024-08-14 PROCEDURE — 80053 COMPREHEN METABOLIC PANEL: CPT

## 2024-08-14 RX ORDER — NIFEDIPINE 30 MG/1
30 TABLET, FILM COATED, EXTENDED RELEASE ORAL EVERY 12 HOURS
Qty: 60 TABLET | Refills: 1 | Status: SHIPPED | OUTPATIENT
Start: 2024-08-14

## 2024-08-14 RX ORDER — FERROUS GLUCONATE 324(38)MG
TABLET ORAL
COMMUNITY
Start: 2024-08-05

## 2024-08-14 ASSESSMENT — EDINBURGH POSTNATAL DEPRESSION SCALE (EPDS)
THE THOUGHT OF HARMING MYSELF HAS OCCURRED TO ME: NEVER
I HAVE BEEN ABLE TO LAUGH AND SEE THE FUNNY SIDE OF THINGS: AS MUCH AS I ALWAYS COULD
I HAVE BEEN ANXIOUS OR WORRIED FOR NO GOOD REASON: NO, NOT AT ALL
I HAVE BLAMED MYSELF UNNECESSARILY WHEN THINGS WENT WRONG: NO, NEVER
I HAVE FELT SAD OR MISERABLE: NO, NOT AT ALL
I HAVE BEEN SO UNHAPPY THAT I HAVE BEEN CRYING: NO, NEVER
I HAVE LOOKED FORWARD WITH ENJOYMENT TO THINGS: AS MUCH AS I EVER DID
I HAVE FELT SCARED OR PANICKY FOR NO GOOD REASON: NO, NOT AT ALL
TOTAL SCORE: 0
I HAVE BEEN SO UNHAPPY THAT I HAVE HAD DIFFICULTY SLEEPING: NOT AT ALL
THINGS HAVE BEEN GETTING ON TOP OF ME: NO, I HAVE BEEN COPING AS WELL AS EVER

## 2024-08-14 NOTE — PROGRESS NOTES
Patient presents for a 1 week postpartum visit   Delivered vaginally on 2024    KRYSTAL Mcgarry     Subjective:  Paige Vieyra is a female 38 y.o. year old  who delivered at 38w1d presenting for 1 week postpartum visit. On 8/3, patient underwent  complicated by pph, d&c, and preeclampsia with severe features    Sent home on no meds, is an MICU nurse.  Uptitrated her own nifedipine to 60 mg daily.  Has headache now but generally relieved with tylenol.  No current RUQ pain, vision changes.   Received one dose IV iron.  Just finished oral iron.    Objective:  Vitals:    24 1446   BP: 110/72   Pulse: 78   SpO2: 97%     Gen: awake, alert  Head: NCAT  HEENT: moist mucus membranes  Pulm: breathing comfortably on room air  CV: warm and well-perfused      Assessment and Plan:  Paige Vieyra is a female 38 y.o. year old  who delivered at 38w1d by  , pph, IUD had to be removed for d&c.  Interested in Nuvaring discussed not recommended with HTN on meds.  Consider IUD placement at 6 weeks with block as patient is worried about pain.  Will recheck labs.    Problem List Items Addressed This Visit    None  Visit Diagnoses       Severe pre-eclampsia, antepartum ([facility])        Relevant Medications    NIFEdipine ER (Procardia XL) 30 mg 24 hr tablet    Other Relevant Orders    CBC Anemia Panel With Reflex,Pregnancy    Comprehensive Metabolic Panel            RTC in 1 year for annual exam or sooner as needed    Dahiana Akins MD

## 2024-08-15 ENCOUNTER — APPOINTMENT (OUTPATIENT)
Dept: OBSTETRICS AND GYNECOLOGY | Facility: CLINIC | Age: 39
End: 2024-08-15
Payer: COMMERCIAL

## 2024-08-15 LAB
FERRITIN SERPL-MCNC: 207 NG/ML
FOLATE SERPL-MCNC: >24 NG/ML
IRON SATN MFR SERPL: 16 %
IRON SERPL-MCNC: 69 UG/DL
REFLEX ADDED, ANEMIA PANEL: NORMAL
TIBC SERPL-MCNC: 440 UG/DL
UIBC SERPL-MCNC: 371 UG/DL
VIT B12 SERPL-MCNC: 1210 PG/ML

## 2024-08-26 DIAGNOSIS — O14.10 SEVERE PRE-ECLAMPSIA, ANTEPARTUM (HHS-HCC): ICD-10-CM

## 2024-08-26 RX ORDER — NIFEDIPINE 30 MG/1
60 TABLET, FILM COATED, EXTENDED RELEASE ORAL DAILY
Qty: 20 TABLET | Refills: 0 | Status: SHIPPED | OUTPATIENT
Start: 2024-08-26 | End: 2024-08-27 | Stop reason: SDUPTHER

## 2024-08-26 RX ORDER — NIFEDIPINE 30 MG/1
60 TABLET, FILM COATED, EXTENDED RELEASE ORAL DAILY
Qty: 20 TABLET | Refills: 0 | Status: SHIPPED | OUTPATIENT
Start: 2024-08-26 | End: 2024-08-26

## 2024-08-27 DIAGNOSIS — O14.10 SEVERE PRE-ECLAMPSIA, ANTEPARTUM (HHS-HCC): ICD-10-CM

## 2024-08-27 RX ORDER — NIFEDIPINE 30 MG/1
60 TABLET, FILM COATED, EXTENDED RELEASE ORAL DAILY
Qty: 20 TABLET | Refills: 0 | Status: SHIPPED | OUTPATIENT
Start: 2024-08-27

## 2024-09-03 ASSESSMENT — ENCOUNTER SYMPTOMS
BLURRED VISION: 0
SWEATS: 0
SHORTNESS OF BREATH: 0
PND: 0
NECK PAIN: 0
HEADACHES: 0
PALPITATIONS: 0
ORTHOPNEA: 0
HYPERTENSION: 1

## 2024-09-04 ENCOUNTER — APPOINTMENT (OUTPATIENT)
Dept: OBSTETRICS AND GYNECOLOGY | Facility: CLINIC | Age: 39
End: 2024-09-04
Payer: COMMERCIAL

## 2024-09-04 RX ORDER — AMLODIPINE BESYLATE 5 MG/1
5 TABLET ORAL DAILY
Qty: 30 TABLET | Refills: 11 | Status: SHIPPED | OUTPATIENT
Start: 2024-09-04 | End: 2025-09-04

## 2024-09-04 NOTE — PROGRESS NOTES
GYNECOLOGY VIRTUAL VISIT PROGRESS NOTE          CC:   Postpartum hypertension       HPI:  Paige Vieyra is scheduled today for virtual visit for 4 wk pp BP check.  Had  one month ago complicated  by preeclampsia with severe features.  Stopped nifedipine 60 XR because ran out one week ago.  Mild range BP at night.  No chest pain, dyspnea.  Audio and Visual communication real-time were utilized and verbal consent was obtained for the encounter.        ROS:  GYN - see HPI      PHYSICAL EXAM:  VS:  n/a (virtual visit)  GEN:  A&O, NAD  PSYCH:  normal affect, non-anxious      IMPRESSION/PLAN:    Problem List Items Addressed This Visit    None  Visit Diagnoses       Postpartum hypertension ([facility])    -  Primary    Relevant Medications    amLODIPine (Norvasc) 5 mg tablet           Since off procardia will start amlodipine 5 mg daily.  Continue daily BP check.  FU 2 weeks bp check in office and Mirena placement with paracervical block.      Dahiana Akins MD   Answers submitted by the patient for this visit:  High Blood Pressure Questionnaire (Submitted on 9/3/2024)  Chief Complaint: Hypertension  Chronicity: new  Onset: 1 to 4 weeks ago  Progression since onset: gradually improving  Condition status: controlled  anxiety: No  blurred vision: No  chest pain: No  headaches: No  malaise/fatigue: No  neck pain: No  orthopnea: No  palpitations: No  peripheral edema: No  PND: No  shortness of breath: No  sweats: No  Agents associated with hypertension: no associated agents  CAD risks: obesity  Compliance problems: no compliance problems

## 2024-09-11 ENCOUNTER — APPOINTMENT (OUTPATIENT)
Dept: OBSTETRICS AND GYNECOLOGY | Facility: CLINIC | Age: 39
End: 2024-09-11
Payer: COMMERCIAL

## 2024-09-11 VITALS — BODY MASS INDEX: 33.47 KG/M2 | DIASTOLIC BLOOD PRESSURE: 73 MMHG | SYSTOLIC BLOOD PRESSURE: 110 MMHG | WEIGHT: 195 LBS

## 2024-09-11 DIAGNOSIS — Z30.430 ENCOUNTER FOR INSERTION OF INTRAUTERINE CONTRACEPTIVE DEVICE (IUD): ICD-10-CM

## 2024-09-11 LAB — PREGNANCY TEST URINE, POC: NEGATIVE

## 2024-09-11 PROCEDURE — 58300 INSERT INTRAUTERINE DEVICE: CPT | Performed by: OBSTETRICS & GYNECOLOGY

## 2024-09-11 PROCEDURE — 81025 URINE PREGNANCY TEST: CPT | Performed by: OBSTETRICS & GYNECOLOGY

## 2024-09-11 RX ORDER — LIDOCAINE HYDROCHLORIDE AND EPINEPHRINE 20; 10 MG/ML; UG/ML
5 INJECTION, SOLUTION INFILTRATION; PERINEURAL ONCE
Status: COMPLETED | OUTPATIENT
Start: 2024-09-11 | End: 2024-09-11

## 2024-09-11 ASSESSMENT — EDINBURGH POSTNATAL DEPRESSION SCALE (EPDS)
TOTAL SCORE: 3
I HAVE BEEN ANXIOUS OR WORRIED FOR NO GOOD REASON: YES, VERY OFTEN
THINGS HAVE BEEN GETTING ON TOP OF ME: NO, I HAVE BEEN COPING AS WELL AS EVER
I HAVE FELT SCARED OR PANICKY FOR NO GOOD REASON: NO, NOT AT ALL
I HAVE BEEN SO UNHAPPY THAT I HAVE BEEN CRYING: NO, NEVER
I HAVE BEEN ABLE TO LAUGH AND SEE THE FUNNY SIDE OF THINGS: AS MUCH AS I ALWAYS COULD
I HAVE BEEN SO UNHAPPY THAT I HAVE HAD DIFFICULTY SLEEPING: NOT AT ALL
I HAVE FELT SAD OR MISERABLE: NO, NOT AT ALL
I HAVE LOOKED FORWARD WITH ENJOYMENT TO THINGS: AS MUCH AS I EVER DID
I HAVE BLAMED MYSELF UNNECESSARILY WHEN THINGS WENT WRONG: NO, NEVER
THE THOUGHT OF HARMING MYSELF HAS OCCURRED TO ME: NEVER

## 2024-09-11 NOTE — PROGRESS NOTES
Irregular bleeding x5 weeks after birth. Bleeding has stopped x1 week but states she now smells an odor.     Insert IUD    Performed by: Dahiana HUGHES MD  Authorized by: Dahiana HUGHES MD    Procedure: IUD insertion    Consent obtained by patient, parent, or legal power of  - including discussion of procedure risks and benefits, patient questions answered, and patient education provided: yes    Pregnancy risk: reasonably certain the patient is not pregnant    Date/Time of Insertion:  9/11/2024 10:25 AM  Immediately prior to procedure a time out was called: yes    Pelvic exam performed: yes    Speculum placed in vagina: yes    Cervix cleaned and prepped: yes    Tenaculum/Allis/Ring Forceps applied to cervix: yes    Anesthesia used: yes    Local anesthesia:  Intracervical  Local anesthetic:  Lidocaine with epinephrine  Anesthetic strength (%):  2  Volume (mL):  5  Uterus sound depth (cm):  8  Cervix manually dilated: no    IUD inserted without complications: yes    OSM: levonorgestrel 21 mcg/24 hr (8 yrs) 52 mg  Strings trimmed to (cm):  3  Patient tolerated procedure well: yes    Inserted with ultrasound guidance: no    Transvaginal sono confirmed fundal placement: no    Estimated blood loss (mL):  1  Intended removal date: 8 years      FU 2 wk ?bleeding/odor  Fu with Dr Walls

## 2024-09-12 ENCOUNTER — APPOINTMENT (OUTPATIENT)
Dept: OBSTETRICS AND GYNECOLOGY | Facility: CLINIC | Age: 39
End: 2024-09-12
Payer: COMMERCIAL

## 2024-09-25 ENCOUNTER — APPOINTMENT (OUTPATIENT)
Dept: OBSTETRICS AND GYNECOLOGY | Facility: CLINIC | Age: 39
End: 2024-09-25
Payer: COMMERCIAL

## 2024-09-25 VITALS
BODY MASS INDEX: 33.12 KG/M2 | SYSTOLIC BLOOD PRESSURE: 115 MMHG | HEIGHT: 64 IN | DIASTOLIC BLOOD PRESSURE: 76 MMHG | WEIGHT: 194 LBS

## 2024-09-25 DIAGNOSIS — N93.9 ABNORMAL UTERINE BLEEDING: ICD-10-CM

## 2024-09-25 DIAGNOSIS — Z12.31 ENCOUNTER FOR SCREENING MAMMOGRAM FOR MALIGNANT NEOPLASM OF BREAST: ICD-10-CM

## 2024-09-25 DIAGNOSIS — Z23 FLU VACCINE NEED: ICD-10-CM

## 2024-09-25 NOTE — PROGRESS NOTES
"GYNECOLOGY PROGRESS NOTE          CC:     Chief Complaint   Patient presents with    1 month follow up         HPI:  Paige Vieyra is here with new complaint of irregular bleeding, s/p Mirena 2 weeks ago.  Had 7 d period after now just spotting.  Does not notice odor, normotensive the last two days at home.  Notes difficult to place tampon.  Taking amlodipine 5 mg daily, has appointment with PCP  in November    Dahiana Akins MD  Past Medical History:   Diagnosis Date    Abnormal dilatation of cervix before onset of labor ([facility])     Abnormal Pap smear of cervix     Phlebitis      labor ([facility])     Sickle cell anemia ([facility])     Urinary tract infection      Past Surgical History:   Procedure Laterality Date    COLPOSCOPY      OTHER SURGICAL HISTORY  2020    Colposcopy    OTHER SURGICAL HISTORY  2020    Cystoscopy    OTHER SURGICAL HISTORY  2020    Laminectomy     Social History     Tobacco Use    Smoking status: Never    Smokeless tobacco: Never   Vaping Use    Vaping status: Never Used   Substance Use Topics    Alcohol use: Not Currently     Comment: Occasional    Drug use: Never             PHYSICAL EXAM:  /76 (BP Location: Right arm, Patient Position: Sitting)   Ht 1.626 m (5' 4\")   Wt 88 kg (194 lb)   Breastfeeding Yes   BMI 33.30 kg/m²   GEN:  A&O, NAD  HEENT:  head HC/AT, no visible goiter  PSYCH:  normal affect, non-anxious  GEN:  A&O, NAD  HEENT:  head NC/AT, conjunctiva clear, no visible goiter  ABD:  NT/ND, soft, no palpable masses  :  normal urethra, no bladder TTP  GYN:  normal vulva and perineum w/o lesions or ulcers, normal vagina without discharge or lesions , normal cervix without lesions or discharge or CMT IUD strings at os , uterus NT/NE anteverted adnexa mobile and NT/NE  DERM:  no hirsutism or acne  PSYCH:  normal affect, non-anxious        IMPRESSION/PLAN:    Problem List Items Addressed This Visit       Postpartum hypertension ([facility]) - " Primary    Current Assessment & Plan     Normotensive on amlodipine 5  Has follow up with PCP         RESOLVED: Flu vaccine need    Abnormal uterine bleeding    Current Assessment & Plan     Discussed typical bleeding pattern after Mirena, follow up as needed

## 2024-10-09 ENCOUNTER — APPOINTMENT (OUTPATIENT)
Dept: OBSTETRICS AND GYNECOLOGY | Facility: CLINIC | Age: 39
End: 2024-10-09
Payer: COMMERCIAL

## 2024-10-23 ENCOUNTER — APPOINTMENT (OUTPATIENT)
Dept: OBSTETRICS AND GYNECOLOGY | Facility: CLINIC | Age: 39
End: 2024-10-23
Payer: COMMERCIAL

## 2024-11-06 ENCOUNTER — APPOINTMENT (OUTPATIENT)
Dept: PRIMARY CARE | Facility: CLINIC | Age: 39
End: 2024-11-06
Payer: COMMERCIAL

## 2024-11-06 VITALS
TEMPERATURE: 98.2 F | HEART RATE: 62 BPM | OXYGEN SATURATION: 97 % | BODY MASS INDEX: 33.29 KG/M2 | WEIGHT: 195 LBS | SYSTOLIC BLOOD PRESSURE: 124 MMHG | HEIGHT: 64 IN | DIASTOLIC BLOOD PRESSURE: 80 MMHG | RESPIRATION RATE: 16 BRPM

## 2024-11-06 DIAGNOSIS — R82.90 ABNORMAL URINALYSIS: Primary | ICD-10-CM

## 2024-11-06 DIAGNOSIS — Z00.00 HEALTHCARE MAINTENANCE: ICD-10-CM

## 2024-11-06 LAB
POC APPEARANCE, URINE: CLEAR
POC BILIRUBIN, URINE: NEGATIVE
POC BLOOD, URINE: ABNORMAL
POC COLOR, URINE: YELLOW
POC GLUCOSE, URINE: NEGATIVE MG/DL
POC KETONES, URINE: NEGATIVE MG/DL
POC LEUKOCYTES, URINE: NEGATIVE
POC NITRITE,URINE: NEGATIVE
POC PH, URINE: 6 PH
POC PROTEIN, URINE: ABNORMAL MG/DL
POC SPECIFIC GRAVITY, URINE: 1.02
POC UROBILINOGEN, URINE: 0.2 EU/DL

## 2024-11-06 PROCEDURE — 1036F TOBACCO NON-USER: CPT | Performed by: FAMILY MEDICINE

## 2024-11-06 PROCEDURE — 90471 IMMUNIZATION ADMIN: CPT | Performed by: FAMILY MEDICINE

## 2024-11-06 PROCEDURE — 3079F DIAST BP 80-89 MM HG: CPT | Performed by: FAMILY MEDICINE

## 2024-11-06 PROCEDURE — 99386 PREV VISIT NEW AGE 40-64: CPT | Performed by: FAMILY MEDICINE

## 2024-11-06 PROCEDURE — 87184 SC STD DISK METHOD PER PLATE: CPT

## 2024-11-06 PROCEDURE — 87086 URINE CULTURE/COLONY COUNT: CPT

## 2024-11-06 PROCEDURE — 3008F BODY MASS INDEX DOCD: CPT | Performed by: FAMILY MEDICINE

## 2024-11-06 PROCEDURE — 90715 TDAP VACCINE 7 YRS/> IM: CPT | Performed by: FAMILY MEDICINE

## 2024-11-06 PROCEDURE — 3074F SYST BP LT 130 MM HG: CPT | Performed by: FAMILY MEDICINE

## 2024-11-06 PROCEDURE — 81002 URINALYSIS NONAUTO W/O SCOPE: CPT | Performed by: FAMILY MEDICINE

## 2024-11-06 PROCEDURE — 87077 CULTURE AEROBIC IDENTIFY: CPT

## 2024-11-06 ASSESSMENT — PATIENT HEALTH QUESTIONNAIRE - PHQ9
SUM OF ALL RESPONSES TO PHQ9 QUESTIONS 1 AND 2: 0
1. LITTLE INTEREST OR PLEASURE IN DOING THINGS: NOT AT ALL
2. FEELING DOWN, DEPRESSED OR HOPELESS: NOT AT ALL

## 2024-11-06 NOTE — PROGRESS NOTES
"Current Outpatient Medications   Medication Sig Dispense Refill    ascorbic acid (Vitamin C) 250 mg tablet Take 1 tablet (250 mg) by mouth once daily.      biotin 10 mg tablet Take by mouth.      copper (Paragard) 380 square mm IUD 1 each by intrauterine route 1 time.      DIETARY SUPPLEMENT ORAL Take by mouth. OX BILE      omega 3-dha-epa-fish oil (Fish OiL) 1,000 mg (120 mg-180 mg) capsule Take by mouth.      prenatal19-iron-folic-omega3 29-1-400 mg combo pack,tablet and cap,DR Take by mouth.       No current facility-administered medications for this visit.   Subjective   Patient ID: Paige Vieyra is a 39 y.o. female who presents for Annual Exam ([facility]).    Dentist and Eye Doctor appointments: due for dentist  Immunizations: due for Tdap  Diet: Tries to eat healthy  Exercise: no routine  Supplements: see list  Menstrual Cycles: regular, IUD  Sexually Active: yes,   Pregnancy History:G 5 P 4  Cancer Screening:    Cervical: 10/2022 - wnl negative hpv   Breast: due   Colon: N/A   Skin: no sunscreen, no new moles   DEXA:N/A        Had preeclampsia after her recent baby and she stopped her bp meds in October and her bp has been good since.         Review of Systems    Objective   /80   Pulse 62   Temp 36.8 °C (98.2 °F)   Resp 16   Ht 1.626 m (5' 4\")   Wt 88.5 kg (195 lb)   LMP 10/15/2024   SpO2 97%   Breastfeeding Yes Comment: last pap 8/2022 gyn  BMI 33.47 kg/m²     Physical Exam  Constitutional:       General: She is not in acute distress.     Appearance: She is well-developed. She is not diaphoretic.   HENT:      Head: Normocephalic and atraumatic.      Right Ear: Tympanic membrane normal.      Left Ear: Tympanic membrane normal.      Nose: Nose normal.      Mouth/Throat:      Mouth: Mucous membranes are moist.   Eyes:      General: No scleral icterus.     Pupils: Pupils are equal, round, and reactive to light.   Neck:      Thyroid: No thyromegaly.      Vascular: No JVD.   Cardiovascular: "      Rate and Rhythm: Normal rate and regular rhythm.      Heart sounds: Normal heart sounds. No murmur heard.     No friction rub. No gallop.   Pulmonary:      Effort: Pulmonary effort is normal. No respiratory distress.      Breath sounds: Normal breath sounds. No wheezing or rales.   Chest:      Chest wall: No tenderness.   Abdominal:      General: Bowel sounds are normal. There is no distension.      Palpations: Abdomen is soft. There is no mass.      Tenderness: There is no abdominal tenderness. There is no rebound.   Musculoskeletal:         General: Normal range of motion.      Cervical back: Normal range of motion and neck supple.   Lymphadenopathy:      Cervical: No cervical adenopathy.   Skin:     General: Skin is warm and dry.   Neurological:      General: No focal deficit present.      Mental Status: She is alert and oriented to person, place, and time.      Deep Tendon Reflexes: Reflexes normal.   Psychiatric:         Mood and Affect: Mood normal.         Behavior: Behavior normal.         Assessment/Plan   Diagnoses and all orders for this visit:  Healthcare maintenance  -     POCT UA (nonautomated) manually resulted  -     CBC; Future  -     Comprehensive Metabolic Panel; Future  -     Lipid Panel; Future  -     TSH with reflex to Free T4 if abnormal; Future  -     Vitamin D 25 hydroxy; Future  Other orders  -     Tdap vaccine, age 7 years and older  (BOOSTRIX)

## 2024-11-06 NOTE — PATIENT INSTRUCTIONS
Welcome to our practice!    Today we performed your Annual Physical Exam.  Your preventative health care, labs and vaccinations have been reviewed and are up to date.     You were given a Tdap vaccine today and this is good for 10 years.     Continue staying off the amlodipine but monitor your BP at home and work.  Follow up if no improvement or if symptoms worsen.      Follow up in 1 year for your Complete Physical Exam

## 2024-11-08 ENCOUNTER — TELEPHONE (OUTPATIENT)
Dept: PRIMARY CARE | Facility: CLINIC | Age: 39
End: 2024-11-08
Payer: COMMERCIAL

## 2024-11-08 DIAGNOSIS — N39.0 URINARY TRACT INFECTION WITHOUT HEMATURIA, SITE UNSPECIFIED: Primary | ICD-10-CM

## 2024-11-08 RX ORDER — NITROFURANTOIN 25; 75 MG/1; MG/1
100 CAPSULE ORAL 2 TIMES DAILY
Qty: 14 CAPSULE | Refills: 0 | Status: SHIPPED | OUTPATIENT
Start: 2024-11-08 | End: 2024-11-15

## 2024-11-09 LAB — BACTERIA UR CULT: ABNORMAL

## 2024-11-11 ENCOUNTER — PATIENT MESSAGE (OUTPATIENT)
Dept: PRIMARY CARE | Facility: CLINIC | Age: 39
End: 2024-11-11
Payer: COMMERCIAL

## 2024-11-15 ENCOUNTER — PATIENT MESSAGE (OUTPATIENT)
Dept: PRIMARY CARE | Facility: CLINIC | Age: 39
End: 2024-11-15
Payer: COMMERCIAL

## 2024-11-15 DIAGNOSIS — I10 HYPERTENSION, UNSPECIFIED TYPE: ICD-10-CM

## 2024-11-18 DIAGNOSIS — R82.90 ABNORMAL URINALYSIS: Primary | ICD-10-CM

## 2024-11-18 NOTE — PROGRESS NOTES
Current Outpatient Medications   Medication Sig Dispense Refill    ascorbic acid (Vitamin C) 250 mg tablet Take 1 tablet (250 mg) by mouth once daily.      biotin 10 mg tablet Take by mouth.      copper (Paragard) 380 square mm IUD 1 each by intrauterine route 1 time.      DIETARY SUPPLEMENT ORAL Take by mouth. OX BILE      omega 3-dha-epa-fish oil (Fish OiL) 1,000 mg (120 mg-180 mg) capsule Take by mouth.      prenatal19-iron-folic-omega3 29-1-400 mg combo pack,tablet and cap,DR Take by mouth.       No current facility-administered medications for this visit.

## 2024-11-19 RX ORDER — AMLODIPINE BESYLATE 5 MG/1
5 TABLET ORAL DAILY
Qty: 30 TABLET | Refills: 2 | Status: SHIPPED | OUTPATIENT
Start: 2024-11-19

## 2024-11-19 RX ORDER — AMLODIPINE BESYLATE 5 MG/1
5 TABLET ORAL DAILY
COMMUNITY
End: 2024-11-19 | Stop reason: SDUPTHER

## 2024-12-06 ENCOUNTER — APPOINTMENT (OUTPATIENT)
Facility: CLINIC | Age: 39
End: 2024-12-06
Payer: COMMERCIAL

## 2024-12-06 ENCOUNTER — LAB (OUTPATIENT)
Dept: LAB | Facility: LAB | Age: 39
End: 2024-12-06
Payer: COMMERCIAL

## 2024-12-06 ENCOUNTER — HOSPITAL ENCOUNTER (OUTPATIENT)
Dept: RADIOLOGY | Facility: CLINIC | Age: 39
Discharge: HOME | End: 2024-12-06
Payer: COMMERCIAL

## 2024-12-06 VITALS
OXYGEN SATURATION: 98 % | HEART RATE: 64 BPM | TEMPERATURE: 97.5 F | RESPIRATION RATE: 18 BRPM | BODY MASS INDEX: 32.95 KG/M2 | DIASTOLIC BLOOD PRESSURE: 84 MMHG | SYSTOLIC BLOOD PRESSURE: 124 MMHG | HEIGHT: 64 IN | WEIGHT: 193 LBS

## 2024-12-06 DIAGNOSIS — Z00.00 HEALTHCARE MAINTENANCE: ICD-10-CM

## 2024-12-06 DIAGNOSIS — R82.90 ABNORMAL URINALYSIS: Primary | ICD-10-CM

## 2024-12-06 DIAGNOSIS — L73.2 AXILLARY HIDRADENITIS SUPPURATIVA: ICD-10-CM

## 2024-12-06 DIAGNOSIS — M25.50 ARTHRALGIA, UNSPECIFIED JOINT: ICD-10-CM

## 2024-12-06 DIAGNOSIS — I10 HYPERTENSION, UNSPECIFIED TYPE: ICD-10-CM

## 2024-12-06 LAB
ERYTHROCYTE [DISTWIDTH] IN BLOOD BY AUTOMATED COUNT: 14.2 % (ref 11.5–14.5)
ERYTHROCYTE [SEDIMENTATION RATE] IN BLOOD BY WESTERGREN METHOD: 29 MM/H (ref 0–20)
HCT VFR BLD AUTO: 42.4 % (ref 36–46)
HGB BLD-MCNC: 12.9 G/DL (ref 12–16)
MCH RBC QN AUTO: 24.5 PG (ref 26–34)
MCHC RBC AUTO-ENTMCNC: 30.4 G/DL (ref 32–36)
MCV RBC AUTO: 81 FL (ref 80–100)
NRBC BLD-RTO: 0 /100 WBCS (ref 0–0)
PLATELET # BLD AUTO: 326 X10*3/UL (ref 150–450)
POC APPEARANCE, URINE: CLEAR
POC BILIRUBIN, URINE: NEGATIVE
POC BLOOD, URINE: NEGATIVE
POC COLOR, URINE: YELLOW
POC GLUCOSE, URINE: NEGATIVE MG/DL
POC KETONES, URINE: NEGATIVE MG/DL
POC LEUKOCYTES, URINE: NEGATIVE
POC NITRITE,URINE: NEGATIVE
POC PH, URINE: 6 PH
POC PROTEIN, URINE: NORMAL MG/DL
POC SPECIFIC GRAVITY, URINE: >=1.03
POC UROBILINOGEN, URINE: 0.2 EU/DL
RBC # BLD AUTO: 5.26 X10*6/UL (ref 4–5.2)
WBC # BLD AUTO: 5.2 X10*3/UL (ref 4.4–11.3)

## 2024-12-06 PROCEDURE — 85027 COMPLETE CBC AUTOMATED: CPT

## 2024-12-06 PROCEDURE — 84443 ASSAY THYROID STIM HORMONE: CPT

## 2024-12-06 PROCEDURE — 85652 RBC SED RATE AUTOMATED: CPT

## 2024-12-06 PROCEDURE — 84550 ASSAY OF BLOOD/URIC ACID: CPT

## 2024-12-06 PROCEDURE — 80053 COMPREHEN METABOLIC PANEL: CPT

## 2024-12-06 PROCEDURE — 86038 ANTINUCLEAR ANTIBODIES: CPT

## 2024-12-06 PROCEDURE — 73600 X-RAY EXAM OF ANKLE: CPT | Mod: LT

## 2024-12-06 PROCEDURE — 3074F SYST BP LT 130 MM HG: CPT | Performed by: FAMILY MEDICINE

## 2024-12-06 PROCEDURE — 82306 VITAMIN D 25 HYDROXY: CPT

## 2024-12-06 PROCEDURE — 3079F DIAST BP 80-89 MM HG: CPT | Performed by: FAMILY MEDICINE

## 2024-12-06 PROCEDURE — 1036F TOBACCO NON-USER: CPT | Performed by: FAMILY MEDICINE

## 2024-12-06 PROCEDURE — 80061 LIPID PANEL: CPT

## 2024-12-06 PROCEDURE — 86235 NUCLEAR ANTIGEN ANTIBODY: CPT

## 2024-12-06 PROCEDURE — 86431 RHEUMATOID FACTOR QUANT: CPT

## 2024-12-06 PROCEDURE — 3008F BODY MASS INDEX DOCD: CPT | Performed by: FAMILY MEDICINE

## 2024-12-06 PROCEDURE — 99214 OFFICE O/P EST MOD 30 MIN: CPT | Performed by: FAMILY MEDICINE

## 2024-12-06 PROCEDURE — 36415 COLL VENOUS BLD VENIPUNCTURE: CPT

## 2024-12-06 PROCEDURE — 81002 URINALYSIS NONAUTO W/O SCOPE: CPT | Performed by: FAMILY MEDICINE

## 2024-12-06 NOTE — PROGRESS NOTES
"Current Outpatient Medications   Medication Sig Dispense Refill    amLODIPine (Norvasc) 5 mg tablet Take 1 tablet (5 mg) by mouth once daily. 30 tablet 2    ascorbic acid (Vitamin C) 250 mg tablet Take 1 tablet (250 mg) by mouth once daily.      biotin 10 mg tablet Take by mouth.      copper (Paragard) 380 square mm IUD 1 each by intrauterine route 1 time.      DIETARY SUPPLEMENT ORAL Take by mouth. OX BILE      omega 3-dha-epa-fish oil (Fish OiL) 1,000 mg (120 mg-180 mg) capsule Take by mouth.      prenatal19-iron-folic-omega3 29-1-400 mg combo pack,tablet and cap,DR Take by mouth.       No current facility-administered medications for this visit.   Subjective   Patient ID: Paige Vieyra is a 39 y.o. female who presents for UTI (Follow up uti and BP).    She had bad diarrhea when she was on the macrobid.  She took 3 days of abx.  She didn't have any symptoms     She checked her bp at work after starting the antibiotic.  She had a headache and diarrhea and checked her bp and it was elevated it was in the 140's/100.  She is now taking amlodipine 5mg and she is checking every day.  She is nursing.      She is complaining of tender lumps that form occasionally under the armpit area.  They haven't been draining.  There is a small one there now on the right that seems to be going away.      She also complains of some joint pain that started during pregnancy especially on the left side in the knees or the ankles sometimes both knees and ankle will hurt.  No swelling, no fluid collection, injury or bruising.  She takes 800mg of ibuprofen to deal with the pain.  She also notes that there is no family history of autoimmune disease like lupus or Ra.      UTI          Review of Systems    Objective   /84   Pulse 64   Temp 36.4 °C (97.5 °F)   Resp 18   Ht 1.626 m (5' 4\")   Wt 87.5 kg (193 lb)   LMP 10/15/2024   SpO2 98%   BMI 33.13 kg/m²     Physical Exam  Constitutional:       Appearance: Normal appearance. "   HENT:      Head: Normocephalic and atraumatic.   Eyes:      Pupils: Pupils are equal, round, and reactive to light.   Cardiovascular:      Rate and Rhythm: Normal rate and regular rhythm.      Pulses: Normal pulses.      Heart sounds: No murmur heard.  Pulmonary:      Effort: Pulmonary effort is normal.      Breath sounds: Normal breath sounds.   Musculoskeletal:         General: No swelling.      Cervical back: Normal range of motion and neck supple.   Neurological:      Mental Status: She is alert.         Assessment/Plan   Diagnoses and all orders for this visit:  Abnormal urinalysis  -     POCT UA (nonautomated) manually resulted  Arthralgia, unspecified joint  -     Arthritis Panel (CMS); Future  -     XR ankle left 2 views; Future  Hypertension, unspecified type  Axillary hidradenitis suppurativa

## 2024-12-06 NOTE — PATIENT INSTRUCTIONS
Today we have addressed multiple issues.  Your BP is well controlled on 5mg of amlodipine.  Please continue this dosage and have all of your labs done that were ordered at your last visit    For your recent UTI we repeat a urine test that was normal.     For your HS I have recommended that you RTC when and if you have a flare up of this again.      For your joint pain I ordered  left ankle xray as this is the worst of the pain for you and also labs for arthritis panel.      Follow up when results received.

## 2024-12-07 LAB
25(OH)D3 SERPL-MCNC: 54 NG/ML (ref 30–100)
ALBUMIN SERPL BCP-MCNC: 4.4 G/DL (ref 3.4–5)
ALP SERPL-CCNC: 72 U/L (ref 33–110)
ALT SERPL W P-5'-P-CCNC: 13 U/L (ref 7–45)
ANION GAP SERPL CALC-SCNC: 15 MMOL/L (ref 10–20)
AST SERPL W P-5'-P-CCNC: 14 U/L (ref 9–39)
BILIRUB SERPL-MCNC: 0.5 MG/DL (ref 0–1.2)
BUN SERPL-MCNC: 11 MG/DL (ref 6–23)
CALCIUM SERPL-MCNC: 9.5 MG/DL (ref 8.6–10.6)
CHLORIDE SERPL-SCNC: 102 MMOL/L (ref 98–107)
CHOLEST SERPL-MCNC: 159 MG/DL (ref 0–199)
CHOLESTEROL/HDL RATIO: 2.3
CO2 SERPL-SCNC: 28 MMOL/L (ref 21–32)
CREAT SERPL-MCNC: 0.76 MG/DL (ref 0.5–1.05)
EGFRCR SERPLBLD CKD-EPI 2021: >90 ML/MIN/1.73M*2
GLUCOSE SERPL-MCNC: 92 MG/DL (ref 74–99)
HDLC SERPL-MCNC: 68.8 MG/DL
LDLC SERPL CALC-MCNC: 82 MG/DL
NON HDL CHOLESTEROL: 90 MG/DL (ref 0–149)
POTASSIUM SERPL-SCNC: 3.7 MMOL/L (ref 3.5–5.3)
PROT SERPL-MCNC: 7.6 G/DL (ref 6.4–8.2)
RHEUMATOID FACT SER NEPH-ACNC: <10 IU/ML (ref 0–15)
SODIUM SERPL-SCNC: 141 MMOL/L (ref 136–145)
TRIGL SERPL-MCNC: 40 MG/DL (ref 0–149)
TSH SERPL-ACNC: 1.02 MIU/L (ref 0.44–3.98)
URATE SERPL-MCNC: 5.7 MG/DL (ref 2.3–6.7)
VLDL: 8 MG/DL (ref 0–40)

## 2024-12-10 ENCOUNTER — TELEPHONE (OUTPATIENT)
Facility: CLINIC | Age: 39
End: 2024-12-10
Payer: COMMERCIAL

## 2024-12-10 DIAGNOSIS — R76.8 POSITIVE ANA (ANTINUCLEAR ANTIBODY): Primary | ICD-10-CM

## 2024-12-10 LAB
ANA PATTERN 2: ABNORMAL
ANA PATTERN: ABNORMAL
ANA SER QL HEP2 SUBST: POSITIVE
ANA TITER 2: ABNORMAL
ANA TITR SER IF: ABNORMAL {TITER}
CENTROMERE B AB SER-ACNC: <0.2 AI
CHROMATIN AB SERPL-ACNC: <0.2 AI
DSDNA AB SER-ACNC: <1 IU/ML
ENA JO1 AB SER QL IA: <0.2 AI
ENA RNP AB SER IA-ACNC: <0.2 AI
ENA SCL70 AB SER QL IA: <0.2 AI
ENA SM AB SER IA-ACNC: <0.2 AI
ENA SM+RNP AB SER QL IA: <0.2 AI
ENA SS-A AB SER IA-ACNC: 5.9 AI
ENA SS-B AB SER IA-ACNC: <0.2 AI
RIBOSOMAL P AB SER-ACNC: <0.2 AI

## 2025-01-24 ENCOUNTER — APPOINTMENT (OUTPATIENT)
Facility: CLINIC | Age: 40
End: 2025-01-24
Payer: COMMERCIAL

## 2025-01-29 ENCOUNTER — APPOINTMENT (OUTPATIENT)
Dept: PRIMARY CARE | Facility: CLINIC | Age: 40
End: 2025-01-29
Payer: COMMERCIAL

## 2025-02-11 ENCOUNTER — APPOINTMENT (OUTPATIENT)
Dept: PRIMARY CARE | Facility: CLINIC | Age: 40
End: 2025-02-11
Payer: COMMERCIAL

## 2025-02-28 ENCOUNTER — APPOINTMENT (OUTPATIENT)
Facility: CLINIC | Age: 40
End: 2025-02-28
Payer: COMMERCIAL

## 2025-02-28 VITALS
HEIGHT: 64 IN | BODY MASS INDEX: 31.41 KG/M2 | TEMPERATURE: 97.9 F | HEART RATE: 85 BPM | WEIGHT: 184 LBS | SYSTOLIC BLOOD PRESSURE: 115 MMHG | DIASTOLIC BLOOD PRESSURE: 82 MMHG

## 2025-02-28 DIAGNOSIS — R76.8 SS-A ANTIBODY POSITIVE: ICD-10-CM

## 2025-02-28 DIAGNOSIS — R76.8 ANA POSITIVE: Primary | ICD-10-CM

## 2025-02-28 PROCEDURE — 3074F SYST BP LT 130 MM HG: CPT | Performed by: INTERNAL MEDICINE

## 2025-02-28 PROCEDURE — 1036F TOBACCO NON-USER: CPT | Performed by: INTERNAL MEDICINE

## 2025-02-28 PROCEDURE — 3008F BODY MASS INDEX DOCD: CPT | Performed by: INTERNAL MEDICINE

## 2025-02-28 PROCEDURE — 99204 OFFICE O/P NEW MOD 45 MIN: CPT | Performed by: INTERNAL MEDICINE

## 2025-02-28 PROCEDURE — 3079F DIAST BP 80-89 MM HG: CPT | Performed by: INTERNAL MEDICINE

## 2025-02-28 NOTE — PROGRESS NOTES
Subjective   Patient ID: [id number]   Paige Vieyra is a 39 y.o. female who presents for Abnormal Lab (BETTY).  HPI    Hypertension, unspecified type  With prior history of Axillary hidradenitis suppurativa   Has not seen dermatology for this issue  Referred for positive BETTY and SSA in the setting of bilateral ankle pains that started during her last pregnancy  Has pain in the left ankle which was severe before but over time it has improved  And now only intermittent pain  Especially after sitting for a while when she gets up she notices stiffness and difficulty walking with first few steps, symptoms then improve , mentions she had swelling before but it has since not treturned  Had pain in the knees as well during pregnancy which has now improved post partum  Denies any rash now, had a lesion in the axilla and was diagnosed as HS by PCP  Never been on Rx for it Has never seen dermatology   Lesion has improved.  Denies photosensitivity, oral or nasal ulcers, photosensitivity, sicca symptms, pleurisy, VTE  2 children  Baby is 7 months old  Had pre-eclampsia with daughter. Mild,  History of spontaneous miscarriages 5 years   20 weeks gestationx once    ROS  Constitutional: Denies fever, chills, weight loss, night sweats or headaches  Eyes: Denies dry eyes, blurry vision, redness or pain or photophobia  ENT: Denies dry mouth, dental loss, loss of taste, nasal or oral ulcers, jaw claudication, difficulty swallowing, nasal crusting or recurrent sinus infections   Cardiovascular: Denies chest pain, palpitations, orthopnea  Respiratory: Denies shortness of breath, cough, asthma, or recurrent respiratory infections  Gastrointestinal: Denies dysphagia, nausea, vomiting, heartburn, abdominal pain, constipation, diarrhea, melena or hematochezia  Genitourinary: No recurrent urinary infections or STDs, no genital or anal ulcers.  Integumentary: Denies photosensitivity, rash or lesions, Raynaud's phenomenon, skin tightening,  digital ulcers, psoriatic lesions, or alopecia  Neurological: Denies any numbness or tingling, muscle weakness, or incontinence   Hematologic/Lymphatic: Denies bleeding, bruising, history of clots (arterial or venous), or abortions/miscarriages/pregnancy complications   MSK: No joint pains, redness, hotness or swelling. No inflammatory back pain, enthesitis, dactylitis. No morning stiffness   Muscular: Denies weakness, difficulty rising from chair or combing the hair, muscle aches, or problems with hand    FHx: No family history of autoimmune diseases       Patient Active Problem List   Diagnosis    Gastroesophageal reflux disease    Postpartum hemorrhage    Postpartum hypertension (HHS-HCC)    Abnormal uterine bleeding    Abnormal urinalysis    Arthralgia    Hypertension    Axillary hidradenitis suppurativa        Past Medical History:   Diagnosis Date    Abnormal dilatation of cervix before onset of labor (HHS-HCC)     Abnormal Pap smear of cervix     Phlebitis     Preeclampsia (HHS-HCC)     Vijaya Richter     labor     Sickle cell anemia (Multi)     Urinary tract infection         Past Surgical History:   Procedure Laterality Date    CHOLECYSTECTOMY      COLPOSCOPY      LUMBAR DISCECTOMY          Social History     Socioeconomic History    Marital status: Significant Other     Spouse name: Rowena Israel Clinton    Number of children: Not on file    Years of education: Not on file    Highest education level: Not on file   Occupational History    Occupation: RN at [employer]   Tobacco Use    Smoking status: Never    Smokeless tobacco: Never   Vaping Use    Vaping status: Never Used   Substance and Sexual Activity    Alcohol use: Not Currently     Comment: Occasional    Drug use: Never    Sexual activity: Yes     Partners: Male     Birth control/protection: I.U.D.   Other Topics Concern    Not on file   Social History Narrative    Not on file     Social Drivers of Health     Financial Resource Strain:  Low Risk  (8/2/2024)    Overall Financial Resource Strain (CARDIA)     Difficulty of Paying Living Expenses: Not hard at all   Food Insecurity: Not on file   Transportation Needs: No Transportation Needs (8/2/2024)    PRAPARE - Transportation     Lack of Transportation (Medical): No     Lack of Transportation (Non-Medical): No   Physical Activity: Not on file   Stress: Not on file   Social Connections: Not on file   Intimate Partner Violence: Not on file        Allergies   Allergen Reactions    Macrobid [Nitrofurantoin Monohyd/M-Cryst] Diarrhea    Codeine Rash and Other    Sulfamethoxazole-Trimethoprim Rash    Vancomycin Rash          Current Outpatient Medications:     amLODIPine (Norvasc) 5 mg tablet, Take 1 tablet (5 mg) by mouth once daily. (Patient not taking: Reported on 2/28/2025), Disp: 30 tablet, Rfl: 2    ascorbic acid (Vitamin C) 250 mg tablet, Take 1 tablet (250 mg) by mouth once daily., Disp: , Rfl:     biotin 10 mg tablet, Take by mouth., Disp: , Rfl:     copper (Paragard) 380 square mm IUD, 1 each by intrauterine route 1 time., Disp: , Rfl:     DIETARY SUPPLEMENT ORAL, Take by mouth. OX BILE, Disp: , Rfl:     omega 3-dha-epa-fish oil (Fish OiL) 1,000 mg (120 mg-180 mg) capsule, Take by mouth., Disp: , Rfl:     prenatal19-iron-folic-omega3 29-1-400 mg combo pack,tablet and cap,DR, Take by mouth., Disp: , Rfl:        Objective     Visit Vitals  /82   Pulse 85   Temp 36.6 °C (97.9 °F)        Physical Exam  Tenderness over the anterior aspect of the talotibilar joint without warmth or effusion  Tenderness with dorsiflexion and inversion of the left foot  MSK ultrasound does not show effusion or tenosynovitis of ant long view  Tarsul tunnel without any pathologies of the PTT, FDL and FHL tendons  Medial subtalar joint without effusion or synovitis    General: AAOx3, Cooperative  Head: normocephalic, atraumatic  Eyes: EOMI, conjunctiva clear, sclera white, anicteric  Ears: no redness, swelling,  tenderness  Nose: no deformity, no crusting   Throat/Mouth: No oral deformities, no cheek swelling, mucosa appear moist, no oral ulcers noted or loss of dentition   Neck/Lymph: FROM, trachea midline  Lungs: chest expansion symmetric. No respiratory distress.   Heart: RRR  Neuro: CN II-XII grossly intact, no focal deficit  Skin: No rashes, ulcers or photosensitive areas  MSK: Upper Extremities:  Hand/Fingers: No erythema, swelling, tenderness or warmth at DIP, PIP, or MCP joints, FROM grossly. Good hand . No nodules. No deformities   Wrists: No erythema, swelling, warmth or tenderness at wrist, FROM grossly  Elbows: No tenderness, swelling, erythema or warmth at elbows, FROM grossly. No nodules   Shoulders: No swelling, erythema, tenderness or warmth at shoulders. FROM  Lower Extremities:   Hips: No obvious deformities. No joint tenderness, normal ROM grossly. Log roll test negative bilaterally. Desean test is negative bilaterally. No trochanteric bursae TTP  Knees: No tenderness, deformities, swelling, rashes, or warmth, normal ROM grossly. No crepitus, no pes anserine bursa TTP   Ankles: No deformities, tenderness, edema, erythema, ulceration, or warmth at the ankle  Feet: Negative MTP squeeze. Normal ROM grossly.   Spine: No spinal tenderness to palpation. No SI joint tenderness. Gaenslen test negative       [unfilled]      Lab Results   Component Value Date    WBC 5.2 12/06/2024    HGB 12.9 12/06/2024    HCT 42.4 12/06/2024    MCV 81 12/06/2024     12/06/2024        Chemistry    Lab Results   Component Value Date/Time     12/06/2024 1216    K 3.7 12/06/2024 1216     12/06/2024 1216    CO2 28 12/06/2024 1216    BUN 11 12/06/2024 1216    CREATININE 0.76 12/06/2024 1216    Lab Results   Component Value Date/Time    CALCIUM 9.5 12/06/2024 1216    ALKPHOS 72 12/06/2024 1216    AST 14 12/06/2024 1216    ALT 13 12/06/2024 1216    BILITOT 0.5 12/06/2024 1216           No results found for:  "\"CRP\"   Lab Results   Component Value Date    BETTY Positive (A) 2024    RF <10 2024    SEDRATE 29 (H) 2024      No results found for: \"CKTOTAL\"  Lab Results   Component Value Date    NEUTROABS 1.63 2022      No results found for: \"FERRITIN\"   Lab Results   Component Value Date    HEPCAB Nonreactive 2023      Lab Results   Component Value Date    ALT 13 2024    AST 14 2024    ALKPHOS 72 2024    BILITOT 0.5 2024      No results found for: \"PPD\"   Lab Results   Component Value Date    URICACID 5.7 2024      Lab Results   Component Value Date    CALCIUM 9.5 2024      No results found for: \"SPEP\", \"UPEP\"   No results found for: \"ALBUR\", \"ZDV95ALO\"   .last          XR ankle left 2 views  Narrative: Interpreted By:  Pedrito Tracy,   STUDY:  XR ANKLE LEFT 2 VIEWS      INDICATION:  Signs/Symptoms:pain.      COMPARISON:  None      ACCESSION NUMBER(S):  [id number]      ORDERING CLINICIAN:  ASAD HECTOR      FINDINGS:  No osseous, articular, soft tissue abnormality.      Impression: Normal radiographs left ankle.      Signed by: Pedrito Tracy 2024 8:46 AM  Dictation workstation:   [id number]     === 24 ===    XR ANKLE 2 VIEWS LEFT    - Impression -  Normal radiographs left ankle.    Signed by: Pedrito Tracy 2024 8:46 AM  Dictation workstation:   [id number]             Assessment/Plan   BETTY positive  SSA +ve  No s/s of Sjogrens or SLE at the moment  Intermittent Ankle pain without findings of synovitis or tenosynovitis   Currently not meeting any criteria of CTD  Will check complements and other serologies  No features of APLS as well    We did talk about SSA positivity and its effect on pregnancy namely  lupus  Both her children were ok during pregnancy and did not have heart blocks  Explained the risks for future and need for HCQ during pregnancy at least, but patient is quite sure she is not going to have more children  Will see labs " and follow up PRN with rheumatology  Physical therapy for ankle can be pursued, did not find any inflammatory pathology on exam, xray and ultrasound  If persists, would benefit from an ortho evaluation.         Zack Uriarte MD    of Medicine  [facility] - Department of Rheumatology  [facility]   Plan, including risks and benefits, was discussed with the patient, informed on how to reach us.     To schedule an appointment, call  [phone] [place] or call [phone] for [facility]

## 2025-02-28 NOTE — PATIENT INSTRUCTIONS
Please do the blood and urine test    We will notify you of the result and need for follow up

## 2025-03-03 LAB
C3 SERPL-MCNC: 135 MG/DL (ref 83–193)
C4 SERPL-MCNC: 42 MG/DL (ref 15–57)
CRP SERPL-MCNC: 10.6 MG/L

## 2025-04-16 ENCOUNTER — APPOINTMENT (OUTPATIENT)
Dept: PRIMARY CARE | Facility: CLINIC | Age: 40
End: 2025-04-16
Payer: COMMERCIAL

## 2025-04-16 VITALS
HEART RATE: 69 BPM | BODY MASS INDEX: 31.58 KG/M2 | SYSTOLIC BLOOD PRESSURE: 140 MMHG | OXYGEN SATURATION: 98 % | DIASTOLIC BLOOD PRESSURE: 90 MMHG | HEIGHT: 64 IN | WEIGHT: 185 LBS

## 2025-04-16 DIAGNOSIS — Z00.00 HEALTHCARE MAINTENANCE: Primary | ICD-10-CM

## 2025-04-16 DIAGNOSIS — R39.15 URINARY URGENCY: ICD-10-CM

## 2025-04-16 PROCEDURE — 3080F DIAST BP >= 90 MM HG: CPT | Performed by: NURSE PRACTITIONER

## 2025-04-16 PROCEDURE — 99396 PREV VISIT EST AGE 40-64: CPT | Performed by: NURSE PRACTITIONER

## 2025-04-16 PROCEDURE — 3077F SYST BP >= 140 MM HG: CPT | Performed by: NURSE PRACTITIONER

## 2025-04-16 PROCEDURE — 3008F BODY MASS INDEX DOCD: CPT | Performed by: NURSE PRACTITIONER

## 2025-04-16 PROCEDURE — 1036F TOBACCO NON-USER: CPT | Performed by: NURSE PRACTITIONER

## 2025-04-16 ASSESSMENT — ENCOUNTER SYMPTOMS
APNEA: 0
HALLUCINATIONS: 0
FREQUENCY: 0
EYE PAIN: 0
DYSURIA: 0
CONSTIPATION: 0
CHILLS: 0
DIARRHEA: 0
DEPRESSION: 0
FEVER: 0
SORE THROAT: 0
WHEEZING: 0
SEIZURES: 0
LOSS OF SENSATION IN FEET: 0
SHORTNESS OF BREATH: 0
WEAKNESS: 0
WOUND: 0
MYALGIAS: 0
ABDOMINAL PAIN: 0
ARTHRALGIAS: 0
RHINORRHEA: 0
HEMATURIA: 0
NAUSEA: 0
COUGH: 0
SLEEP DISTURBANCE: 0
PALPITATIONS: 0
ACTIVITY CHANGE: 0
TREMORS: 0
OCCASIONAL FEELINGS OF UNSTEADINESS: 0
CONFUSION: 0
DIZZINESS: 0
VOMITING: 0

## 2025-04-16 ASSESSMENT — PATIENT HEALTH QUESTIONNAIRE - PHQ9
1. LITTLE INTEREST OR PLEASURE IN DOING THINGS: NOT AT ALL
SUM OF ALL RESPONSES TO PHQ9 QUESTIONS 1 AND 2: 0
2. FEELING DOWN, DEPRESSED OR HOPELESS: NOT AT ALL

## 2025-04-16 NOTE — PROGRESS NOTES
"Subjective   Patient ID: Paige Vieyra is a 39 y.o. female who presents for Establish Care. Currently breast feeding.     HPI   HS:   HTN: hx mild pre-eclampsia. Previously on amlodipine 5 mg every day. She's been off medication since November with normal home readings.   SOCIAL: Living at home with 3 children 1 year old, 6 year old, 8 mo old daughter. 22 year old daughter not living at home. Working at RN at [employer].   GYN: followed by Dr. Akins. Copper IUD in place.   Exercise:  Alcohol use: 4-5 drinks/ mo  Tobacco use: none   Review of Systems   Constitutional:  Negative for activity change, chills and fever.   HENT:  Negative for congestion, ear pain, rhinorrhea and sore throat.    Eyes:  Negative for pain and visual disturbance.   Respiratory:  Negative for apnea, cough, shortness of breath and wheezing.    Cardiovascular:  Negative for chest pain, palpitations and leg swelling.   Gastrointestinal:  Negative for abdominal pain, constipation, diarrhea, nausea and vomiting.   Genitourinary:  Negative for dysuria, frequency, hematuria, urgency, vaginal bleeding and vaginal discharge.   Musculoskeletal:  Negative for arthralgias, gait problem and myalgias.   Skin:  Negative for rash and wound.   Neurological:  Negative for dizziness, tremors, seizures and weakness.   Psychiatric/Behavioral:  Negative for confusion, hallucinations, sleep disturbance and suicidal ideas.        Objective   /90   Pulse 69   Ht 1.626 m (5' 4\")   Wt 83.9 kg (185 lb)   SpO2 98%   BMI 31.76 kg/m²     Physical Exam  Constitutional:       Appearance: Normal appearance.   HENT:      Head: Normocephalic.      Right Ear: Tympanic membrane, ear canal and external ear normal.      Left Ear: Tympanic membrane, ear canal and external ear normal.      Nose: Nose normal.      Mouth/Throat:      Mouth: Mucous membranes are moist.      Pharynx: Oropharynx is clear. No oropharyngeal exudate or posterior oropharyngeal erythema. "   Eyes:      Extraocular Movements: Extraocular movements intact.      Conjunctiva/sclera: Conjunctivae normal.      Pupils: Pupils are equal, round, and reactive to light.   Cardiovascular:      Rate and Rhythm: Normal rate and regular rhythm.      Pulses: Normal pulses.      Heart sounds: Normal heart sounds.   Pulmonary:      Effort: Pulmonary effort is normal.      Breath sounds: Normal breath sounds.   Abdominal:      General: Abdomen is flat. Bowel sounds are normal.      Palpations: Abdomen is soft.   Musculoskeletal:         General: No signs of injury. Normal range of motion.   Skin:     General: Skin is warm and dry.   Neurological:      General: No focal deficit present.      Mental Status: She is alert and oriented to person, place, and time.   Psychiatric:         Mood and Affect: Mood normal.         Behavior: Behavior normal.         Judgment: Judgment normal.       Assessment/Plan   Diagnoses and all orders for this visit:  Urinary urgency  -     Referral to Physical Therapy; Future - pelvic floor PT   Elevated BP: monitor Bps at home, if not therapeutic she will restart amlodipine 5 mg every day- she has pill at home.    Health maintenance: mammogram UTD due 09/2025.   FU annually for CPE 4/16/2026

## 2025-06-02 DIAGNOSIS — I10 PRIMARY HYPERTENSION: Primary | ICD-10-CM

## 2025-06-03 RX ORDER — AMLODIPINE BESYLATE 5 MG/1
5 TABLET ORAL DAILY
Qty: 90 TABLET | Refills: 2 | Status: SHIPPED | OUTPATIENT
Start: 2025-06-03 | End: 2025-11-30

## 2025-06-04 PROCEDURE — RXMED WILLOW AMBULATORY MEDICATION CHARGE

## 2025-06-05 ENCOUNTER — PHARMACY VISIT (OUTPATIENT)
Dept: PHARMACY | Facility: CLINIC | Age: 40
End: 2025-06-05
Payer: COMMERCIAL

## 2025-07-23 DIAGNOSIS — Z12.31 ENCOUNTER FOR SCREENING MAMMOGRAM FOR BREAST CANCER: ICD-10-CM

## 2025-08-06 ENCOUNTER — APPOINTMENT (OUTPATIENT)
Dept: PRIMARY CARE | Facility: CLINIC | Age: 40
End: 2025-08-06
Payer: COMMERCIAL

## 2025-08-06 DIAGNOSIS — Z00.00 ANNUAL PHYSICAL EXAM: ICD-10-CM

## 2025-08-06 DIAGNOSIS — I10 HYPERTENSION, UNSPECIFIED TYPE: Primary | ICD-10-CM

## 2025-08-06 PROCEDURE — 99213 OFFICE O/P EST LOW 20 MIN: CPT | Performed by: NURSE PRACTITIONER

## 2025-08-06 RX ORDER — AMLODIPINE BESYLATE 5 MG/1
5 TABLET ORAL DAILY
Qty: 90 TABLET | Refills: 3 | Status: SHIPPED | OUTPATIENT
Start: 2025-08-06

## 2025-08-06 ASSESSMENT — ENCOUNTER SYMPTOMS
WOUND: 0
SORE THROAT: 0
WEAKNESS: 0
MYALGIAS: 0
VOMITING: 0
DIZZINESS: 0
SHORTNESS OF BREATH: 0
ABDOMINAL PAIN: 0
EYE PAIN: 0
DIARRHEA: 0
SLEEP DISTURBANCE: 0
CHILLS: 0
TREMORS: 0
CONFUSION: 0
APNEA: 0
RHINORRHEA: 0
PALPITATIONS: 0
WHEEZING: 0
COUGH: 0
ACTIVITY CHANGE: 0
NAUSEA: 0
DYSURIA: 0
SEIZURES: 0
FREQUENCY: 0
CONSTIPATION: 0
FEVER: 0
HEMATURIA: 0
HALLUCINATIONS: 0
ARTHRALGIAS: 0

## 2025-08-06 NOTE — PROGRESS NOTES
Subjective   Patient ID: Paige Vieyra is a 39 y.o. female who presents for BP follow up. Bps at home have been 120s/80s. She has been taking amlodipine 5 mg every day with no complaints, denies any ankle swelling. No longer breast feeding.     HPI   HS:   HTN: hx mild pre-eclampsia. Previously on amlodipine 5 mg every day. She's been off medication since November with normal home readings.   SOCIAL: Living at home with 3 children 1 year old, 6 year old, 8 mo old daughter. 22 year old daughter not living at home. Working at RN at [employer].   GYN: followed by Dr. Akins. Copper IUD in place.   Exercise:  Alcohol use: 4-5 drinks/ mo  Tobacco use: none   Review of Systems   Constitutional:  Negative for activity change, chills and fever.   HENT:  Negative for congestion, ear pain, rhinorrhea and sore throat.    Eyes:  Negative for pain and visual disturbance.   Respiratory:  Negative for apnea, cough, shortness of breath and wheezing.    Cardiovascular:  Negative for chest pain, palpitations and leg swelling.   Gastrointestinal:  Negative for abdominal pain, constipation, diarrhea, nausea and vomiting.   Genitourinary:  Negative for dysuria, frequency, hematuria, urgency, vaginal bleeding and vaginal discharge.   Musculoskeletal:  Negative for arthralgias, gait problem and myalgias.   Skin:  Negative for rash and wound.   Neurological:  Negative for dizziness, tremors, seizures and weakness.   Psychiatric/Behavioral:  Negative for confusion, hallucinations, sleep disturbance and suicidal ideas.        Objective   There were no vitals taken for this visit.    Physical Exam  Constitutional:       Appearance: Normal appearance.   HENT:      Head: Normocephalic.      Right Ear: Tympanic membrane, ear canal and external ear normal.      Left Ear: Tympanic membrane, ear canal and external ear normal.      Nose: Nose normal.      Mouth/Throat:      Mouth: Mucous membranes are moist.      Pharynx: Oropharynx is  clear. No oropharyngeal exudate or posterior oropharyngeal erythema.     Eyes:      Extraocular Movements: Extraocular movements intact.      Conjunctiva/sclera: Conjunctivae normal.      Pupils: Pupils are equal, round, and reactive to light.       Cardiovascular:      Rate and Rhythm: Normal rate and regular rhythm.      Pulses: Normal pulses.      Heart sounds: Normal heart sounds.   Pulmonary:      Effort: Pulmonary effort is normal.      Breath sounds: Normal breath sounds.   Abdominal:      General: Abdomen is flat. Bowel sounds are normal.      Palpations: Abdomen is soft.     Musculoskeletal:         General: No signs of injury. Normal range of motion.     Skin:     General: Skin is warm and dry.     Neurological:      General: No focal deficit present.      Mental Status: She is alert and oriented to person, place, and time.     Psychiatric:         Mood and Affect: Mood normal.         Behavior: Behavior normal.         Judgment: Judgment normal.       Assessment/Plan   Diagnoses and all orders for this visit:  1. Hypertension, unspecified type (Primary)    - stable, continue amLODIPine (Norvasc) 5 mg tablet; Take 1 tablet (5 mg) by mouth once daily.  Dispense: 90 tablet; Refill: 3     Health maintenance: mammogram UTD due 09/2025.   FU annually for CPE 4/16/2026 with labs prior - ordered.

## 2026-04-01 ENCOUNTER — APPOINTMENT (OUTPATIENT)
Dept: PRIMARY CARE | Facility: CLINIC | Age: 41
End: 2026-04-01
Payer: COMMERCIAL